# Patient Record
Sex: FEMALE | Race: BLACK OR AFRICAN AMERICAN | NOT HISPANIC OR LATINO | URBAN - METROPOLITAN AREA
[De-identification: names, ages, dates, MRNs, and addresses within clinical notes are randomized per-mention and may not be internally consistent; named-entity substitution may affect disease eponyms.]

---

## 2023-10-26 ENCOUNTER — INPATIENT (INPATIENT)
Facility: HOSPITAL | Age: 88
LOS: 4 days | Discharge: HOME HEALTH | End: 2023-10-31
Attending: STUDENT IN AN ORGANIZED HEALTH CARE EDUCATION/TRAINING PROGRAM | Admitting: STUDENT IN AN ORGANIZED HEALTH CARE EDUCATION/TRAINING PROGRAM
Payer: MEDICARE

## 2023-10-26 VITALS
RESPIRATION RATE: 25 BRPM | TEMPERATURE: 100 F | SYSTOLIC BLOOD PRESSURE: 134 MMHG | OXYGEN SATURATION: 95 % | HEART RATE: 108 BPM | DIASTOLIC BLOOD PRESSURE: 54 MMHG

## 2023-10-26 DIAGNOSIS — R22.0 LOCALIZED SWELLING, MASS AND LUMP, HEAD: ICD-10-CM

## 2023-10-26 DIAGNOSIS — R41.82 ALTERED MENTAL STATUS, UNSPECIFIED: ICD-10-CM

## 2023-10-26 DIAGNOSIS — I10 ESSENTIAL (PRIMARY) HYPERTENSION: ICD-10-CM

## 2023-10-26 DIAGNOSIS — L89.90 PRESSURE ULCER OF UNSPECIFIED SITE, UNSPECIFIED STAGE: ICD-10-CM

## 2023-10-26 DIAGNOSIS — A41.50 GRAM-NEGATIVE SEPSIS, UNSPECIFIED: ICD-10-CM

## 2023-10-26 DIAGNOSIS — A41.9 SEPSIS, UNSPECIFIED ORGANISM: ICD-10-CM

## 2023-10-26 DIAGNOSIS — E78.5 HYPERLIPIDEMIA, UNSPECIFIED: ICD-10-CM

## 2023-10-26 LAB
ALBUMIN SERPL ELPH-MCNC: 2.2 G/DL — LOW (ref 3.3–5)
ALBUMIN SERPL ELPH-MCNC: 2.2 G/DL — LOW (ref 3.3–5)
ALP SERPL-CCNC: 118 U/L — SIGNIFICANT CHANGE UP (ref 40–120)
ALP SERPL-CCNC: 118 U/L — SIGNIFICANT CHANGE UP (ref 40–120)
ALT FLD-CCNC: 26 U/L — SIGNIFICANT CHANGE UP (ref 12–78)
ALT FLD-CCNC: 26 U/L — SIGNIFICANT CHANGE UP (ref 12–78)
ANION GAP SERPL CALC-SCNC: 9 MMOL/L — SIGNIFICANT CHANGE UP (ref 5–17)
ANION GAP SERPL CALC-SCNC: 9 MMOL/L — SIGNIFICANT CHANGE UP (ref 5–17)
APPEARANCE UR: ABNORMAL
APPEARANCE UR: ABNORMAL
APTT BLD: 28 SEC — SIGNIFICANT CHANGE UP (ref 24.5–35.6)
APTT BLD: 28 SEC — SIGNIFICANT CHANGE UP (ref 24.5–35.6)
AST SERPL-CCNC: 70 U/L — HIGH (ref 15–37)
AST SERPL-CCNC: 70 U/L — HIGH (ref 15–37)
BACTERIA # UR AUTO: ABNORMAL
BACTERIA # UR AUTO: ABNORMAL
BASOPHILS # BLD AUTO: 0 K/UL — SIGNIFICANT CHANGE UP (ref 0–0.2)
BASOPHILS # BLD AUTO: 0 K/UL — SIGNIFICANT CHANGE UP (ref 0–0.2)
BASOPHILS NFR BLD AUTO: 0 % — SIGNIFICANT CHANGE UP (ref 0–2)
BASOPHILS NFR BLD AUTO: 0 % — SIGNIFICANT CHANGE UP (ref 0–2)
BILIRUB SERPL-MCNC: 0.5 MG/DL — SIGNIFICANT CHANGE UP (ref 0.2–1.2)
BILIRUB SERPL-MCNC: 0.5 MG/DL — SIGNIFICANT CHANGE UP (ref 0.2–1.2)
BILIRUB UR-MCNC: NEGATIVE — SIGNIFICANT CHANGE UP
BILIRUB UR-MCNC: NEGATIVE — SIGNIFICANT CHANGE UP
BUN SERPL-MCNC: 6 MG/DL — LOW (ref 7–23)
BUN SERPL-MCNC: 6 MG/DL — LOW (ref 7–23)
CALCIUM SERPL-MCNC: 8.3 MG/DL — LOW (ref 8.5–10.1)
CALCIUM SERPL-MCNC: 8.3 MG/DL — LOW (ref 8.5–10.1)
CHLORIDE SERPL-SCNC: 105 MMOL/L — SIGNIFICANT CHANGE UP (ref 96–108)
CHLORIDE SERPL-SCNC: 105 MMOL/L — SIGNIFICANT CHANGE UP (ref 96–108)
CO2 SERPL-SCNC: 22 MMOL/L — SIGNIFICANT CHANGE UP (ref 22–31)
CO2 SERPL-SCNC: 22 MMOL/L — SIGNIFICANT CHANGE UP (ref 22–31)
COLOR SPEC: YELLOW — SIGNIFICANT CHANGE UP
COLOR SPEC: YELLOW — SIGNIFICANT CHANGE UP
COMMENT - URINE: SIGNIFICANT CHANGE UP
COMMENT - URINE: SIGNIFICANT CHANGE UP
CREAT SERPL-MCNC: 0.48 MG/DL — LOW (ref 0.5–1.3)
CREAT SERPL-MCNC: 0.48 MG/DL — LOW (ref 0.5–1.3)
DACRYOCYTES BLD QL SMEAR: SLIGHT — SIGNIFICANT CHANGE UP
DACRYOCYTES BLD QL SMEAR: SLIGHT — SIGNIFICANT CHANGE UP
DIFF PNL FLD: NEGATIVE — SIGNIFICANT CHANGE UP
DIFF PNL FLD: NEGATIVE — SIGNIFICANT CHANGE UP
EGFR: 87 ML/MIN/1.73M2 — SIGNIFICANT CHANGE UP
EGFR: 87 ML/MIN/1.73M2 — SIGNIFICANT CHANGE UP
EOSINOPHIL # BLD AUTO: 0 K/UL — SIGNIFICANT CHANGE UP (ref 0–0.5)
EOSINOPHIL # BLD AUTO: 0 K/UL — SIGNIFICANT CHANGE UP (ref 0–0.5)
EOSINOPHIL NFR BLD AUTO: 0 % — SIGNIFICANT CHANGE UP (ref 0–6)
EOSINOPHIL NFR BLD AUTO: 0 % — SIGNIFICANT CHANGE UP (ref 0–6)
EPI CELLS # UR: SIGNIFICANT CHANGE UP
EPI CELLS # UR: SIGNIFICANT CHANGE UP
GLUCOSE SERPL-MCNC: 99 MG/DL — SIGNIFICANT CHANGE UP (ref 70–99)
GLUCOSE SERPL-MCNC: 99 MG/DL — SIGNIFICANT CHANGE UP (ref 70–99)
GLUCOSE UR QL: NEGATIVE MG/DL — SIGNIFICANT CHANGE UP
GLUCOSE UR QL: NEGATIVE MG/DL — SIGNIFICANT CHANGE UP
HCT VFR BLD CALC: 25.4 % — LOW (ref 34.5–45)
HCT VFR BLD CALC: 25.4 % — LOW (ref 34.5–45)
HGB BLD-MCNC: 7.4 G/DL — LOW (ref 11.5–15.5)
HGB BLD-MCNC: 7.4 G/DL — LOW (ref 11.5–15.5)
INR BLD: 1.13 RATIO — SIGNIFICANT CHANGE UP (ref 0.85–1.18)
INR BLD: 1.13 RATIO — SIGNIFICANT CHANGE UP (ref 0.85–1.18)
KETONES UR-MCNC: NEGATIVE — SIGNIFICANT CHANGE UP
KETONES UR-MCNC: NEGATIVE — SIGNIFICANT CHANGE UP
LACTATE SERPL-SCNC: 1.5 MMOL/L — SIGNIFICANT CHANGE UP (ref 0.7–2)
LACTATE SERPL-SCNC: 1.5 MMOL/L — SIGNIFICANT CHANGE UP (ref 0.7–2)
LEUKOCYTE ESTERASE UR-ACNC: ABNORMAL
LEUKOCYTE ESTERASE UR-ACNC: ABNORMAL
LYMPHOCYTES # BLD AUTO: 1.43 K/UL — SIGNIFICANT CHANGE UP (ref 1–3.3)
LYMPHOCYTES # BLD AUTO: 1.43 K/UL — SIGNIFICANT CHANGE UP (ref 1–3.3)
LYMPHOCYTES # BLD AUTO: 10 % — LOW (ref 13–44)
LYMPHOCYTES # BLD AUTO: 10 % — LOW (ref 13–44)
MANUAL SMEAR VERIFICATION: YES — SIGNIFICANT CHANGE UP
MANUAL SMEAR VERIFICATION: YES — SIGNIFICANT CHANGE UP
MCHC RBC-ENTMCNC: 22.6 PG — LOW (ref 27–34)
MCHC RBC-ENTMCNC: 22.6 PG — LOW (ref 27–34)
MCHC RBC-ENTMCNC: 29.1 G/DL — LOW (ref 32–36)
MCHC RBC-ENTMCNC: 29.1 G/DL — LOW (ref 32–36)
MCV RBC AUTO: 77.4 FL — LOW (ref 80–100)
MCV RBC AUTO: 77.4 FL — LOW (ref 80–100)
MICROCYTES BLD QL: SIGNIFICANT CHANGE UP
MICROCYTES BLD QL: SIGNIFICANT CHANGE UP
MONOCYTES # BLD AUTO: 1 K/UL — HIGH (ref 0–0.9)
MONOCYTES # BLD AUTO: 1 K/UL — HIGH (ref 0–0.9)
MONOCYTES NFR BLD AUTO: 7 % — SIGNIFICANT CHANGE UP (ref 2–14)
MONOCYTES NFR BLD AUTO: 7 % — SIGNIFICANT CHANGE UP (ref 2–14)
NEUTROPHILS # BLD AUTO: 11.86 K/UL — HIGH (ref 1.8–7.4)
NEUTROPHILS # BLD AUTO: 11.86 K/UL — HIGH (ref 1.8–7.4)
NEUTROPHILS NFR BLD AUTO: 81 % — HIGH (ref 43–77)
NEUTROPHILS NFR BLD AUTO: 81 % — HIGH (ref 43–77)
NEUTS BAND # BLD: 2 % — SIGNIFICANT CHANGE UP (ref 0–8)
NEUTS BAND # BLD: 2 % — SIGNIFICANT CHANGE UP (ref 0–8)
NITRITE UR-MCNC: POSITIVE
NITRITE UR-MCNC: POSITIVE
NRBC # BLD: 0 /100 — SIGNIFICANT CHANGE UP (ref 0–0)
NRBC # BLD: 0 /100 — SIGNIFICANT CHANGE UP (ref 0–0)
NRBC # BLD: SIGNIFICANT CHANGE UP /100 WBCS (ref 0–0)
NRBC # BLD: SIGNIFICANT CHANGE UP /100 WBCS (ref 0–0)
PH UR: 8 — SIGNIFICANT CHANGE UP (ref 5–8)
PH UR: 8 — SIGNIFICANT CHANGE UP (ref 5–8)
PLAT MORPH BLD: NORMAL — SIGNIFICANT CHANGE UP
PLAT MORPH BLD: NORMAL — SIGNIFICANT CHANGE UP
PLATELET # BLD AUTO: 489 K/UL — HIGH (ref 150–400)
PLATELET # BLD AUTO: 489 K/UL — HIGH (ref 150–400)
POIKILOCYTOSIS BLD QL AUTO: SLIGHT — SIGNIFICANT CHANGE UP
POIKILOCYTOSIS BLD QL AUTO: SLIGHT — SIGNIFICANT CHANGE UP
POTASSIUM SERPL-MCNC: 4.4 MMOL/L — SIGNIFICANT CHANGE UP (ref 3.5–5.3)
POTASSIUM SERPL-MCNC: 4.4 MMOL/L — SIGNIFICANT CHANGE UP (ref 3.5–5.3)
POTASSIUM SERPL-SCNC: 4.4 MMOL/L — SIGNIFICANT CHANGE UP (ref 3.5–5.3)
POTASSIUM SERPL-SCNC: 4.4 MMOL/L — SIGNIFICANT CHANGE UP (ref 3.5–5.3)
PROT SERPL-MCNC: 7.6 GM/DL — SIGNIFICANT CHANGE UP (ref 6–8.3)
PROT SERPL-MCNC: 7.6 GM/DL — SIGNIFICANT CHANGE UP (ref 6–8.3)
PROT UR-MCNC: NEGATIVE MG/DL — SIGNIFICANT CHANGE UP
PROT UR-MCNC: NEGATIVE MG/DL — SIGNIFICANT CHANGE UP
PROTHROM AB SERPL-ACNC: 13.4 SEC — HIGH (ref 9.5–13)
PROTHROM AB SERPL-ACNC: 13.4 SEC — HIGH (ref 9.5–13)
RAPID RVP RESULT: SIGNIFICANT CHANGE UP
RAPID RVP RESULT: SIGNIFICANT CHANGE UP
RBC # BLD: 3.28 M/UL — LOW (ref 3.8–5.2)
RBC # BLD: 3.28 M/UL — LOW (ref 3.8–5.2)
RBC # FLD: 20.7 % — HIGH (ref 10.3–14.5)
RBC # FLD: 20.7 % — HIGH (ref 10.3–14.5)
RBC BLD AUTO: NORMAL — SIGNIFICANT CHANGE UP
RBC BLD AUTO: NORMAL — SIGNIFICANT CHANGE UP
RBC CASTS # UR COMP ASSIST: SIGNIFICANT CHANGE UP /HPF (ref 0–4)
RBC CASTS # UR COMP ASSIST: SIGNIFICANT CHANGE UP /HPF (ref 0–4)
SARS-COV-2 RNA SPEC QL NAA+PROBE: SIGNIFICANT CHANGE UP
SARS-COV-2 RNA SPEC QL NAA+PROBE: SIGNIFICANT CHANGE UP
SODIUM SERPL-SCNC: 136 MMOL/L — SIGNIFICANT CHANGE UP (ref 135–145)
SODIUM SERPL-SCNC: 136 MMOL/L — SIGNIFICANT CHANGE UP (ref 135–145)
SP GR SPEC: 1.01 — SIGNIFICANT CHANGE UP (ref 1.01–1.02)
SP GR SPEC: 1.01 — SIGNIFICANT CHANGE UP (ref 1.01–1.02)
TROPONIN I, HIGH SENSITIVITY RESULT: 17.1 NG/L — SIGNIFICANT CHANGE UP
TROPONIN I, HIGH SENSITIVITY RESULT: 17.1 NG/L — SIGNIFICANT CHANGE UP
UROBILINOGEN FLD QL: NEGATIVE MG/DL — SIGNIFICANT CHANGE UP
UROBILINOGEN FLD QL: NEGATIVE MG/DL — SIGNIFICANT CHANGE UP
WBC # BLD: 14.29 K/UL — HIGH (ref 3.8–10.5)
WBC # BLD: 14.29 K/UL — HIGH (ref 3.8–10.5)
WBC # FLD AUTO: 14.29 K/UL — HIGH (ref 3.8–10.5)
WBC # FLD AUTO: 14.29 K/UL — HIGH (ref 3.8–10.5)
WBC UR QL: ABNORMAL
WBC UR QL: ABNORMAL

## 2023-10-26 PROCEDURE — 70450 CT HEAD/BRAIN W/O DYE: CPT | Mod: 26,MA

## 2023-10-26 PROCEDURE — 71045 X-RAY EXAM CHEST 1 VIEW: CPT | Mod: 26

## 2023-10-26 PROCEDURE — 99285 EMERGENCY DEPT VISIT HI MDM: CPT

## 2023-10-26 PROCEDURE — 99291 CRITICAL CARE FIRST HOUR: CPT

## 2023-10-26 PROCEDURE — 99223 1ST HOSP IP/OBS HIGH 75: CPT

## 2023-10-26 PROCEDURE — 93010 ELECTROCARDIOGRAM REPORT: CPT

## 2023-10-26 PROCEDURE — 99222 1ST HOSP IP/OBS MODERATE 55: CPT

## 2023-10-26 RX ORDER — VANCOMYCIN HCL 1 G
1000 VIAL (EA) INTRAVENOUS ONCE
Refills: 0 | Status: COMPLETED | OUTPATIENT
Start: 2023-10-26 | End: 2023-10-26

## 2023-10-26 RX ORDER — CEFEPIME 1 G/1
1000 INJECTION, POWDER, FOR SOLUTION INTRAMUSCULAR; INTRAVENOUS ONCE
Refills: 0 | Status: COMPLETED | OUTPATIENT
Start: 2023-10-26 | End: 2023-10-26

## 2023-10-26 RX ORDER — CEFTRIAXONE 500 MG/1
1000 INJECTION, POWDER, FOR SOLUTION INTRAMUSCULAR; INTRAVENOUS EVERY 24 HOURS
Refills: 0 | Status: DISCONTINUED | OUTPATIENT
Start: 2023-10-26 | End: 2023-10-29

## 2023-10-26 RX ORDER — PANTOPRAZOLE SODIUM 20 MG/1
40 TABLET, DELAYED RELEASE ORAL
Refills: 0 | Status: DISCONTINUED | OUTPATIENT
Start: 2023-10-26 | End: 2023-10-27

## 2023-10-26 RX ORDER — SODIUM CHLORIDE 9 MG/ML
1000 INJECTION INTRAMUSCULAR; INTRAVENOUS; SUBCUTANEOUS ONCE
Refills: 0 | Status: COMPLETED | OUTPATIENT
Start: 2023-10-26 | End: 2023-10-26

## 2023-10-26 RX ORDER — HEPARIN SODIUM 5000 [USP'U]/ML
5000 INJECTION INTRAVENOUS; SUBCUTANEOUS EVERY 8 HOURS
Refills: 0 | Status: DISCONTINUED | OUTPATIENT
Start: 2023-10-26 | End: 2023-10-26

## 2023-10-26 RX ORDER — HYDROCHLOROTHIAZIDE 25 MG
1 TABLET ORAL
Refills: 0 | DISCHARGE

## 2023-10-26 RX ORDER — AMLODIPINE BESYLATE 2.5 MG/1
1 TABLET ORAL
Refills: 0 | DISCHARGE

## 2023-10-26 RX ORDER — SODIUM CHLORIDE 9 MG/ML
1000 INJECTION, SOLUTION INTRAVENOUS
Refills: 0 | Status: DISCONTINUED | OUTPATIENT
Start: 2023-10-26 | End: 2023-10-27

## 2023-10-26 RX ORDER — LANOLIN ALCOHOL/MO/W.PET/CERES
3 CREAM (GRAM) TOPICAL AT BEDTIME
Refills: 0 | Status: DISCONTINUED | OUTPATIENT
Start: 2023-10-26 | End: 2023-10-31

## 2023-10-26 RX ORDER — PANTOPRAZOLE SODIUM 20 MG/1
1 TABLET, DELAYED RELEASE ORAL
Refills: 0 | DISCHARGE

## 2023-10-26 RX ORDER — AMLODIPINE BESYLATE 2.5 MG/1
5 TABLET ORAL DAILY
Refills: 0 | Status: DISCONTINUED | OUTPATIENT
Start: 2023-10-26 | End: 2023-10-27

## 2023-10-26 RX ORDER — ENOXAPARIN SODIUM 100 MG/ML
40 INJECTION SUBCUTANEOUS EVERY 24 HOURS
Refills: 0 | Status: DISCONTINUED | OUTPATIENT
Start: 2023-10-26 | End: 2023-10-31

## 2023-10-26 RX ORDER — ROSUVASTATIN CALCIUM 5 MG/1
1 TABLET ORAL
Refills: 0 | DISCHARGE

## 2023-10-26 RX ORDER — ASPIRIN/CALCIUM CARB/MAGNESIUM 324 MG
81 TABLET ORAL DAILY
Refills: 0 | Status: DISCONTINUED | OUTPATIENT
Start: 2023-10-26 | End: 2023-10-27

## 2023-10-26 RX ORDER — ACETAMINOPHEN 500 MG
650 TABLET ORAL EVERY 6 HOURS
Refills: 0 | Status: DISCONTINUED | OUTPATIENT
Start: 2023-10-26 | End: 2023-10-27

## 2023-10-26 RX ORDER — ATORVASTATIN CALCIUM 80 MG/1
20 TABLET, FILM COATED ORAL AT BEDTIME
Refills: 0 | Status: DISCONTINUED | OUTPATIENT
Start: 2023-10-26 | End: 2023-10-27

## 2023-10-26 RX ORDER — MORPHINE SULFATE 50 MG/1
1 CAPSULE, EXTENDED RELEASE ORAL EVERY 6 HOURS
Refills: 0 | Status: DISCONTINUED | OUTPATIENT
Start: 2023-10-26 | End: 2023-10-27

## 2023-10-26 RX ORDER — ASPIRIN/CALCIUM CARB/MAGNESIUM 324 MG
1 TABLET ORAL
Refills: 0 | DISCHARGE

## 2023-10-26 RX ORDER — ACETAMINOPHEN 500 MG
1000 TABLET ORAL ONCE
Refills: 0 | Status: COMPLETED | OUTPATIENT
Start: 2023-10-26 | End: 2023-10-26

## 2023-10-26 RX ADMIN — CEFEPIME 1000 MILLIGRAM(S): 1 INJECTION, POWDER, FOR SOLUTION INTRAMUSCULAR; INTRAVENOUS at 12:02

## 2023-10-26 RX ADMIN — SODIUM CHLORIDE 1000 MILLILITER(S): 9 INJECTION INTRAMUSCULAR; INTRAVENOUS; SUBCUTANEOUS at 11:16

## 2023-10-26 RX ADMIN — Medication 1000 MILLIGRAM(S): at 13:15

## 2023-10-26 RX ADMIN — SODIUM CHLORIDE 1000 MILLILITER(S): 9 INJECTION INTRAMUSCULAR; INTRAVENOUS; SUBCUTANEOUS at 12:40

## 2023-10-26 RX ADMIN — Medication 250 MILLIGRAM(S): at 12:02

## 2023-10-26 RX ADMIN — Medication 1000 MILLIGRAM(S): at 11:49

## 2023-10-26 RX ADMIN — Medication 400 MILLIGRAM(S): at 11:29

## 2023-10-26 RX ADMIN — Medication 1000 MILLIGRAM(S): at 13:08

## 2023-10-26 RX ADMIN — SODIUM CHLORIDE 75 MILLILITER(S): 9 INJECTION, SOLUTION INTRAVENOUS at 17:01

## 2023-10-26 RX ADMIN — CEFTRIAXONE 100 MILLIGRAM(S): 500 INJECTION, POWDER, FOR SOLUTION INTRAMUSCULAR; INTRAVENOUS at 18:03

## 2023-10-26 RX ADMIN — ENOXAPARIN SODIUM 40 MILLIGRAM(S): 100 INJECTION SUBCUTANEOUS at 18:03

## 2023-10-26 RX ADMIN — CEFEPIME 100 MILLIGRAM(S): 1 INJECTION, POWDER, FOR SOLUTION INTRAMUSCULAR; INTRAVENOUS at 11:30

## 2023-10-26 NOTE — PATIENT PROFILE ADULT - FALL HARM RISK - HARM RISK INTERVENTIONS

## 2023-10-26 NOTE — H&P ADULT - PROBLEM SELECTOR PLAN 6
stage 3 left buttock ulcer, dose not appear to be infected  Wound care PT consult  Frequent turn/positioning

## 2023-10-26 NOTE — H&P ADULT - HISTORY OF PRESENT ILLNESS
Talked to daughter Katlyn ( [phone])  89 years old female with h/o HTN, HLD, GERD, decubitus ulcer was brought in to ED with concern for AMS. Daughter noted patient appear confused today AM with some blood around the tongue. Patient reported dysuria for last few days. No nausea or vomiting. Patient is bedbound since around 08/2023. Patient also appear to have some dysphagia and is currently only able to eat puree diet.   Febrile to 101.7, tachycardic upon arrival. WBC 14.29, plt 489, Hb 7.4, K 4.4, Cr 0.48, hsTnT 17.1, lactate 1.5. UA positive for UTI. RVP negative. CT head with no acute pathology. Mild to mod chronic microvascular changes. CXR with no focal consolidation    SH: no toxic habits

## 2023-10-26 NOTE — ED ADULT NURSE NOTE - AS SC BRADEN FRICTION
CBC, CMP, med clearance.  Labs done from outside facility - Dr. Frausto's office will fax.   (1) problem

## 2023-10-26 NOTE — CONSULT NOTE ADULT - ASSESSMENT
Assessment: 89 year old female with PMHx of HTN & HLD with tongue swelling secondary to bite wound.     Recommendations:   - Tongue swelling 2/2 to bite wound. Possibly from seizure activity?   - Empiric Antibiotics to treat UTI. Follow up urine culture.   - Reconsult if change in voice, stridor, not protecting airway, worsening swelling or inability to clear secretions.   -Patient hemodynamically stable and does not require ICU level of care. Discussed with ICU attending MD Martinez

## 2023-10-26 NOTE — H&P ADULT - NSHPPHYSICALEXAM_GEN_ALL_CORE
CONSTITUTIONAL: alert and cooperative, no acute distress.  EYES: PERRL, EOMI,  ENT: Mucosa moist, moderate tongue swelling noted. Small area of bleeding noted on floor of tongue near frenulum area, likely due to lower teeth deformity putting chronic pressure on bottom of tongue  NECK: Neck supple, trachea midline, non-tender  CARDIAC: Normal S1 and S2. Regular rate and rhythms. No Pedal edema. Peripheral pulses intact  LUNGS: Equal air entry both lungs. No rales, rhonchi, wheezing. Normal respiratory effort.   ABDOMEN: Soft, nondistended, nontender. No guarding or rebound tenderness. No hepatomegaly or splenomegaly. Bowel sound normal.  MUSCULOSKELETAL: Normocephalic, atraumatic. No significant deformity or joint abnormality. Stage 3 left buttock decubitus ulcer, dose not appear to be infected  NEUROLOGICAL: Move all extremities  PSYCHIATRIC: A&O x 2, appropriate mood and affect.

## 2023-10-26 NOTE — H&P ADULT - NSVTERISKREFERASSESS_GEN_ALL_CORE
MG tablet, , Disp: , Rfl:     atorvastatin (LIPITOR) 80 MG tablet, Take 80 mg by mouth daily, Disp: , Rfl:     clopidogrel (PLAVIX) 75 MG tablet, Take 75 mg by mouth daily, Disp: , Rfl:     aspirin 81 MG tablet, Take 81 mg by mouth daily, Disp: , Rfl:     Multiple Vitamins-Minerals (THERAPEUTIC MULTIVITAMIN-MINERALS) tablet, Take 1 tablet by mouth daily, Disp: , Rfl:     HYDROcodone-acetaminophen (NORCO) 5-325 MG per tablet, Take 1 tablet by mouth three times daily. , Disp: 90 tablet, Rfl: 0    gabapentin (NEURONTIN) 100 MG capsule, Take 1 capsule by mouth daily (before lunch). , Disp: 30 capsule, Rfl: 5    FIBER PO, Take  by mouth., Disp: , Rfl:     vitamin D (CHOLECALCIFEROL) 1000 UNIT TABS tablet, Take 1,000 Units by mouth daily. , Disp: , Rfl:     diphenhydrAMINE (BENADRYL) 25 MG tablet, Take 25 mg by mouth every 6 hours as needed. , Disp: , Rfl:     tiZANidine (ZANAFLEX) 4 MG tablet, Take 1 tablet by mouth every 8 hours as needed. , Disp: 90 tablet, Rfl: 5    docusate sodium (COLACE) 100 MG capsule, Take 100 mg by mouth 2 times daily. , Disp: , Rfl:   Allergies:  Demerol; Colton Bear; Benazepril hcl; Benicar [olmesartan medoxomil]; Hctz; and Succinylcholine chloride  Social History:    reports that he has never smoked. He has never used smokeless tobacco. He reports current alcohol use. He reports that he does not use drugs.   Family History:   Family History   Problem Relation Age of Onset    Diabetes Neg Hx     High Cholesterol Neg Hx     High Blood Pressure Neg Hx        REVIEW OF SYSTEMS: Full ROS noted & scanned   CONSTITUTIONAL: Denies unexplained weight loss, fevers, chills or fatigue  NEUROLOGICAL: Denies unsteady gait or progressive weakness  MUSCULOSKELETAL: Denies joint swelling or redness  PSYCHOLOGICAL: Denies anxiety, depression   SKIN: Denies skin changes, delayed healing, rash, itching   HEMATOLOGIC: Denies easy bleeding or bruising  ENDOCRINE: Denies excessive thirst, urination, Refer to the Assessment tab to view/cancel completed assessment.

## 2023-10-26 NOTE — H&P ADULT - ASSESSMENT
89 years old female with h/o HTN, HLD, GERD, decubitus ulcer was brought in to ED with concern for AMS. Daughter noted patient appear confused today AM with some blood around the tongue. Patient reported dysuria for last few days. No nausea or vomiting. Patient is bedbound since around 08/2023. Patient also appear to have some dysphagia and is currently only able to eat puree diet.   Febrile to 101.7, tachycardic upon arrival. WBC 14.29, plt 489, Hb 7.4, K 4.4, Cr 0.48, hsTnT 17.1, lactate 1.5. UA positive for UTI. RVP negative. CT head  ( I personally review) with no acute pathology. Mild to mod chronic microvascular changes. CXR  ( I personally review) with no focal consolidation

## 2023-10-26 NOTE — H&P ADULT - PROBLEM SELECTOR PLAN 3
Small area of bleeding noted on floor of tongue near frenulum area, likely due to lower teeth deformity putting chronic pressure on bottom of tongue   No other area of tongue trauma noted. No stridor  Tongue swelling likely related to trauma from lower teeth  Not on any new medication. Not on ACEI or ARB  Speech/swallow evaluation  Pain control  Aspiration precaution  ICU consulted. Protecting airway. No need for ICU care at this time  Closely monitor respiratory status  Would benefit from outpatient dental follow up

## 2023-10-26 NOTE — PHARMACOTHERAPY INTERVENTION NOTE - COMMENTS
Recommended to switch from heparin 5000 units q8H to enoxaparin 40mg q24H for DVT prophylaxis as patient has normal renal function (Scr 0.48).

## 2023-10-26 NOTE — H&P ADULT - CONVERSATION DETAILS
Discussed with daughter at bedside about GOC. Daughter said she would have to discuss with her siblings first. Remain full code at this time. Palliative care consulted for GOC discussion.

## 2023-10-26 NOTE — ED PROVIDER NOTE - PHYSICAL EXAMINATION
GEN: Awake, alert, interactive, NAD.  HEAD AND NECK: NC/AT. Airway patent. Neck supple.   EYES:  Clear b/l. EOMI. PERRL.   ENT: Moist mucus membranes. (+) dry blood on the tongue with mild swelling of the tongue.   CARDIAC: Regular rate, regular rhythm. No evident pedal edema.    RESP/CHEST: Normal respiratory effort with no use of accessory muscles or retractions. Clear throughout on auscultation.  ABD: soft, non-distended, non-tender. No rebound, no guarding.   BACK: No midline spinal TTP. No CVAT.   EXTREMITIES: Moving all extremities with no apparent deformities.   SKIN: Warm, dry, intact normal color. No rash.   NEURO: AOx1, CN II-XII grossly intact,  Pt moving all extremities.   PSYCH: Appropriate mood and affect.

## 2023-10-26 NOTE — ED PROVIDER NOTE - ATTENDING APP SHARED VISIT CONTRIBUTION OF CARE
Patient evaluated and seen with CHONG Roberts as a shared visit - agree with above history and physical - pt examined and seen by me personally - findings as seen:  Pt seen as possible sepsis and new onset seizure - otherwise noted on labs to have UTI and mental status improving - otherwise given sepsis fluids and abx and will admit for further evaluation. Neurology also consulted for possible new onset seizure        35 minutes of critical care provided given pt presentation of early sepsis with UTI/AMS

## 2023-10-26 NOTE — ED ADULT NURSE NOTE - NSFALLHARMRISKINTERV_ED_ALL_ED
Assistance OOB with selected safe patient handling equipment if applicable/Assistance with ambulation/Communicate risk of Fall with Harm to all staff, patient, and family/Monitor gait and stability/Monitor for mental status changes and reorient to person, place, and time, as needed/Provide visual cue: red socks, yellow wristband, yellow gown, etc/Reinforce activity limits and safety measures with patient and family/Toileting schedule using arm’s reach rule for commode and bathroom/Use of alarms - bed, stretcher, chair and/or video monitoring/Bed in lowest position, wheels locked, appropriate side rails in place/Call bell, personal items and telephone in reach/Instruct patient to call for assistance before getting out of bed/chair/stretcher/Non-slip footwear applied when patient is off stretcher/[place] to call system/Physically safe environment - no spills, clutter or unnecessary equipment/Purposeful Proactive Rounding/Room/bathroom lighting operational, light cord in reach

## 2023-10-26 NOTE — CONSULT NOTE ADULT - SUBJECTIVE AND OBJECTIVE BOX
INTERVAL HPI: 89 y/o female with HTN & HLD, brought in by EMS for AMS today. As per daughter pt was found this morning with swollen tongue & bleeding from her mouth. Daughter is concerned for possible seizures, but did not witness at convulsions. Pt does not have history of seizures. Daughter states pt was responsive and well yesterday around 8PM. Daughter states patient has normal speech. Daughter states pt has had a couch since yesterday. Pt does not take any ACE-I as per list from daughters phone. Daughter denies any trouble breathing, wheezing or recent falls.      SUBJECTIVE: Patient seen and examined at bedside for possible airway compromise. Pt is in no acute distress currently on NC. Mild tongue swelling noted and patient actively trying to speak.     ROS: All negative except as listed above.    VITAL SIGNS:  ICU Vital Signs Last 24 Hrs  T(C): 37.5 (26 Oct 2023 13:15), Max: 38.7 (26 Oct 2023 11:16)  T(F): 99.5 (26 Oct 2023 13:15), Max: 101.7 (26 Oct 2023 11:16)  HR: 98 (26 Oct 2023 13:15) (97 - 108)  BP: 110/46 (26 Oct 2023 13:15) (97/51 - 134/54)  BP(mean): --  ABP: --  ABP(mean): --  RR: 21 (26 Oct 2023 13:15) (19 - 25)  SpO2: 99% (26 Oct 2023 13:15) (95% - 100%)    O2 Parameters below as of 26 Oct 2023 13:15  Patient On (Oxygen Delivery Method): nasal cannula  O2 Flow (L/min): 2        ECG: reviewed.    PHYSICAL EXAM:  GENERAL: NAD, lying in bed comfortably  HEAD:  Atraumatic, normocephalic  EYES: EOMI, PERRLA, conjunctiva and sclera clear  ENT: Mild tongue swelling with clear wound to underside of tongue surrounded by dried blood. No stridor, lip or facial swelling.   NECK: Supple, trachea midline, no JVD, no swelling, masses or crepitus. Anterior neck non-tender to palpation.  HEART: Regular rate and rhythm, no murmurs, rubs, or gallops  LUNGS: Unlabored respirations. Clear to auscultation bilaterally, no crackles, wheezing, or rhonchi  ABDOMEN: Soft, nontender, nondistended, +BS  EXTREMITIES: 2+ peripheral pulses bilaterally. No clubbing or cyanosis. Mild LE edema bilaterally.   NERVOUS SYSTEM:  A&Ox3, following commands, moving all extremities, no focal deficits   SKIN: No rashes. (+) wound to the left buttock.     MEDICATIONS:  MEDICATIONS  (STANDING):    MEDICATIONS  (PRN):      ALLERGIES:  Allergies    No Known Allergies    Intolerances        LABS:                        7.4    14.29 )-----------( 489      ( 26 Oct 2023 11:10 )             25.4     10-26    136  |  105  |  6<L>  ----------------------------<  99  4.4   |  22  |  0.48<L>    Ca    8.3<L>      26 Oct 2023 11:10    TPro  7.6  /  Alb  2.2<L>  /  TBili  0.5  /  DBili  x   /  AST  70<H>  /  ALT  26  /  AlkPhos  118  10-26    PT/INR - ( 26 Oct 2023 11:10 )   PT: 13.4 sec;   INR: 1.13 ratio         PTT - ( 26 Oct 2023 11:10 )  PTT:28.0 sec  Urinalysis Basic - ( 26 Oct 2023 12:00 )  Color: Yellow / Appearance: Slightly Turbid / S.010 / pH: x  Gluc: x / Ketone: Negative  / Bili: Negative / Urobili: Negative mg/dL   Blood: x / Protein: Negative mg/dL / Nitrite: Positive   Leuk Esterase: Trace / RBC: 0-2 /HPF / WBC 6-10   Sq Epi: x / Non Sq Epi: x / Bacteria: Few            RADIOLOGY & ADDITIONAL TESTS: Reviewed.    < from: CT Head No Cont (10.26.23 @ 12:21) >  IMPRESSION:  Mild to moderate chronic microvascular changes without   evidence of an acute transcortical infarction or hemorrhage.    --- End of Report ---    < end of copied text >    < from: Xray Chest 1 View-PORTABLE IMMEDIATE (10.26.23 @ 11:28) >    IMPRESSION:  Mildly elevated right hemidiaphragm.    No focal lung consolidation.    --- End of Report ---    < end of copied text >

## 2023-10-26 NOTE — CONSULT NOTE ADULT - CRITICAL CARE ATTENDING COMMENT
89M HTN HLD Brought in from home for oral bleeding. Suspect seizure activity? no medications notable for seizure induction agents. UA notable for possible UTI. Lab work otherwise unremarkable. Physical exam consistent with tongue biting. Resp intact. no wheezing / stridor / tachypena / voice changes / throat symptoms. Patient in no immediate concern for airway collapse at the moment. recommend abx for UTI. No indication for ICU admission at the moment.

## 2023-10-26 NOTE — ED PROVIDER NOTE - NS ED ATTENDING STATEMENT MOD
I have personally provided the amount of critical care time documented below concurrently with the resident/fellow.  This time excludes time spent on separate procedures and time spent teaching. I have reviewed the resident’s / fellow’s documentation and I agree with the history, exam, and assessment and plan of care. This was a shared visit with the TANA. I reviewed and verified the documentation and independently performed the documented:

## 2023-10-26 NOTE — H&P ADULT - PROBLEM SELECTOR PLAN 2
Likely metabolic encephalopathy due to UTI  CT head  ( I personally review) no acute pathology. Mild to mod chronic microvascular changes  Mental status at baseline  check EEG

## 2023-10-26 NOTE — H&P ADULT - PROBLEM SELECTOR PLAN 1
fever, leukocytosis, tachycardic upon arrival, transient confusion, UTI  Received Vanco and cefepime in ED  continue ceftriaxone, follow up culture data

## 2023-10-26 NOTE — ED ADULT NURSE NOTE - BREATHING, MLM
Used blue language line  #[id number] to review discharge information with patient and cousin, Zach Phillips. Patient and cousin verbalized understanding of written instructions communicated through . Spontaneous, unlabored and symmetrical

## 2023-10-26 NOTE — ED ADULT NURSE NOTE - OBJECTIVE STATEMENT
pt presents to the ed with daughter who states pt is altered. Daughter stated she put pt to bed at 9am last night and woke up this morning saw her tongue with dried blood and swollen. Pt is noted to be alert to her self, warm to touch, pt is febrile, sepsis protocols were activated. Pt was noted to have +4 b/l LE, heeling pressure injury on left hip area and 2x1cm pressure injury on left buttocks. b/L breath sounds are clear, pt is noted to be drooling, pt saturating at 100% with 2LNC. No abdominal tenderness noted on palpation. pt placed on cardiac monitor SR noted.

## 2023-10-26 NOTE — ED PROVIDER NOTE - ATTENDING CONTRIBUTION TO CARE
Patient evaluated and seen with CHONG Roberts as a shared visit - agree with above history and physical - pt examined and seen by me personally - findings as seen:  Pt seen as possible sepsis and new onset seizure - otherwise noted on labs to have UTI and mental status improving - otherwise given sepsis fluids and abx and will admit for further evaluation. Neurology also consulted for possible new onset seizure

## 2023-10-26 NOTE — ED PROVIDER NOTE - CLINICAL SUMMARY MEDICAL DECISION MAKING FREE TEXT BOX
89 female with htn, high chol here with unresponsive today Rectal t 101.  Sepsis labs, cxr, ekg, ct head ordered and treat with ivf, tylenol, and empirical abx .

## 2023-10-26 NOTE — CONSULT NOTE ADULT - SUBJECTIVE AND OBJECTIVE BOX
BIBEMS from home.  History from Admission H&P    <Start of quote(s) from H&P>  "Reason for Admission: sepsis due to UTI,  tongue swelling  History of Present Illness:   Talked to daughter Katlyn ( [phone])  89 years old female with h/o HTN, HLD, GERD, decubitus ulcer was brought in to ED with concern for AMS. Daughter noted patient appear confused today AM with some blood around the tongue. Patient reported dysuria for last few days. No nausea or vomiting. Patient is bedbound since around 08/2023. Patient also appear to have some dysphagia and is currently only able to eat puree diet.   Febrile to 101.7, tachycardic upon arrival. WBC 14.29, plt 489, Hb 7.4, K 4.4, Cr 0.48, hsTnT 17.1, lactate 1.5. UA positive for UTI. RVP negative. CT head with no acute pathology. Mild to mod chronic microvascular changes. CXR with no focal consolidation    SH: no toxic habits       Review of Systems:  Review of Systems: All ROS were negative except transient confusion, tongue swelling, dysuria   . . .       Home Medications:   * Patient Currently Takes Medications as of 26-Oct-2023 14:56 documented in Structured Notes  · 	aspirin 81 mg oral delayed release tablet: Last Dose Taken:  , 1 tab(s) orally once a day  · 	hydroCHLOROthiazide 25 mg oral tablet: Last Dose Taken:  , 1 tab(s) orally once a day  · 	amLODIPine 5 mg oral tablet: Last Dose Taken:  , 1 tab(s) orally once a day  · 	pantoprazole 40 mg oral delayed release tablet: Last Dose Taken:  , 1 tab(s) orally once a day  · 	rosuvastatin 5 mg oral tablet: Last Dose Taken:  , 1 tab(s) orally once a day   . . .   · Substance use	No"  <End of quote(s) from H&P>         BIBEMS from home.  History from Admission H&P    <Start of quote(s) from H&P>  "Reason for Admission: sepsis due to UTI,  tongue swelling  History of Present Illness:   Talked to daughter Katlyn ( [phone])  89 years old female with h/o HTN, HLD, GERD, decubitus ulcer was brought in to ED with concern for AMS. Daughter noted patient appear confused today AM with some blood around the tongue. Patient reported dysuria for last few days. No nausea or vomiting. Patient is bedbound since around 08/2023. Patient also appear to have some dysphagia and is currently only able to eat puree diet.   Febrile to 101.7, tachycardic upon arrival. WBC 14.29, plt 489, Hb 7.4, K 4.4, Cr 0.48, hsTnT 17.1, lactate 1.5. UA positive for UTI. RVP negative. CT head with no acute pathology. Mild to mod chronic microvascular changes. CXR with no focal consolidation    SH: no toxic habits       Review of Systems:  Review of Systems: All ROS were negative except transient confusion, tongue swelling, dysuria   . . .       Home Medications:   * Patient Currently Takes Medications as of 26-Oct-2023 14:56 documented in Structured Notes  · 	aspirin 81 mg oral delayed release tablet: Last Dose Taken:  , 1 tab(s) orally once a day  · 	hydroCHLOROthiazide 25 mg oral tablet: Last Dose Taken:  , 1 tab(s) orally once a day  · 	amLODIPine 5 mg oral tablet: Last Dose Taken:  , 1 tab(s) orally once a day  · 	pantoprazole 40 mg oral delayed release tablet: Last Dose Taken:  , 1 tab(s) orally once a day  · 	rosuvastatin 5 mg oral tablet: Last Dose Taken:  , 1 tab(s) orally once a day   . . .   · Substance use	No"  <End of quote(s) from H&P>    Per my interviewing Pt's daughter:    Pt last got out of bed with assistance 8/9/23.  She last was able to get out of bed without assistance some ?two yeara ago.  Reportedly has had no falls.      Per radiology report of non-con CT head:  "FINDINGS:  There is periventricular and subcortical white matter hypodensity without   mass effect, nonspecific, likely representing mild to moderate chronic   microvascular ischemic changes. There is no compelling evidence for an   acute transcortical infarction. There is no evidence of mass, mass   effect, midline shift or extra-axial fluid collection. The lateral   ventricles and cortical sulci are age-appropriate in size and   configuration. Mild inflammatory mucosal changes are seen throughout the   various portions of the paranasal sinuses. The orbits and mastoid air   cells are unremarkable. The calvarium is intact. Consider MRI as   clinically warranted.    IMPRESSION:  Mild to moderate chronic microvascular changes without   evidence of an acute transcortical infarction or hemorrhage."    Microcytic anemia.  Leucocytosis  Turbid urine, nitrite positive.      EXAMINATION     Awake. On [place], semi-recumbent.  Hard of hearing. VERY dysarthric.  Provides her first name but not her surnname, even though asked specifically.        Reflex                           Right    Left   Comment    Biceps                             1        1  Triceps                            0        0  Patellar                            0       0  Gastroc                           0       0  Plantar                         mute extensor?   BIBEMS from home.  History from Admission H&P    <Start of quote(s) from H&P>  "Reason for Admission: sepsis due to UTI,  tongue swelling  History of Present Illness:   Talked to daughter Katlyn ( [phone])  89 years old female with h/o HTN, HLD, GERD, decubitus ulcer was brought in to ED with concern for AMS. Daughter noted patient appear confused today AM with some blood around the tongue. Patient reported dysuria for last few days. No nausea or vomiting. Patient is bedbound since around 08/2023. Patient also appear to have some dysphagia and is currently only able to eat puree diet.   Febrile to 101.7, tachycardic upon arrival. WBC 14.29, plt 489, Hb 7.4, K 4.4, Cr 0.48, hsTnT 17.1, lactate 1.5. UA positive for UTI. RVP negative. CT head with no acute pathology. Mild to mod chronic microvascular changes. CXR with no focal consolidation    SH: no toxic habits       Review of Systems:  Review of Systems: All ROS were negative except transient confusion, tongue swelling, dysuria   . . .       Home Medications:   * Patient Currently Takes Medications as of 26-Oct-2023 14:56 documented in Structured Notes  · 	aspirin 81 mg oral delayed release tablet: Last Dose Taken:  , 1 tab(s) orally once a day  · 	hydroCHLOROthiazide 25 mg oral tablet: Last Dose Taken:  , 1 tab(s) orally once a day  · 	amLODIPine 5 mg oral tablet: Last Dose Taken:  , 1 tab(s) orally once a day  · 	pantoprazole 40 mg oral delayed release tablet: Last Dose Taken:  , 1 tab(s) orally once a day  · 	rosuvastatin 5 mg oral tablet: Last Dose Taken:  , 1 tab(s) orally once a day   . . .   · Substance use	No"  <End of quote(s) from H&P>    Per my interviewing Pt's daughter:    Pt last got out of bed with assistance 8/9/23.  She last was able to get out of bed without assistance some ?two yeara ago.  Reportedly has had no falls.      Per radiology report of non-con CT head:  "FINDINGS:  There is periventricular and subcortical white matter hypodensity without   mass effect, nonspecific, likely representing mild to moderate chronic   microvascular ischemic changes. There is no compelling evidence for an   acute transcortical infarction. There is no evidence of mass, mass   effect, midline shift or extra-axial fluid collection. The lateral   ventricles and cortical sulci are age-appropriate in size and   configuration. Mild inflammatory mucosal changes are seen throughout the   various portions of the paranasal sinuses. The orbits and mastoid air   cells are unremarkable. The calvarium is intact. Consider MRI as   clinically warranted.    IMPRESSION:  Mild to moderate chronic microvascular changes without   evidence of an acute transcortical infarction or hemorrhage."    Microcytic anemia.  Leucocytosis  Turbid urine, nitrite positive.      EXAMINATION     Awake. On Gur[place], semi-recumbent.  Hard of hearing. VERY dysarthric.  Profound psychomotor slowing.  Provides her first name but not her surname, even though asked specifically.  Responds that she is 89 (years old).  Meiotic pupils, min reactive.  Gaze appears to be grossly conjugate.  Does not follow instructions for confrontation testing of visual fields.  Appears to have bilateral visual threat responses.  Contused swollen tip of tongue (bilaterally).  Tongue is deviated in place to the R.      Flexion contractures all four limbs.  Elbows cannot be passively extended past ~150 degrees.  EXTREMELY weak all four limbs.  Finger flexors appear to be weaker on the R side  (cannot assess other UE muscle function to any degree of reliability to detect asymmetries).      Non-specific motor response whenever asked to do something is to open her mouth (non-specific cortical release sign).      Reflex                           Right    Left   Comment    Biceps                             1        1  Triceps                            0        0  Patellar                            0       0  Gastroc                           0       0  Plantar                         mute extensor?    Does not provide answers when asked to differentiate LT from pin stimuli.  Delayed wincing to irritating stimulation of any limb; no obvious asymmetry.

## 2023-10-26 NOTE — ED PROVIDER NOTE - OBJECTIVE STATEMENT
89 y/o female with htn , high chol brought in by EMS for AMS today. As per daughter pt was unresponsive today and bleeding from her mouth, states might have bit her tongue. Daughter states pt was responsive and well yesterday around 8PM. Denies fever ,vomiting, coughing or recent illnesses. Pt has no history of seizures in the past or recent falls.

## 2023-10-26 NOTE — ED ADULT TRIAGE NOTE - CHIEF COMPLAINT QUOTE
BIBEMS from home AMS r/o sepsis as per family pt was unresponsive and was bleeding from her mouth pt with swollen tongue warm to touch . pt with a 18 g to right arm with NS running by EMS

## 2023-10-26 NOTE — CONSULT NOTE ADULT - ASSESSMENT
Bedbound >2 months.    Functionally quadriplegic.      UTI.     Toxic metabolic encephalopathy.     Likely demented at baseline.      Appears to have a R hemiparesis (tongue deviation and R hand weakness).  Age unknown.      Seizure seems unlikely.        RECOMMENDATIONS    B12, folate, FE, TIBC, methylmalonic acid, homocysteine, TSH, RPR.     Bedbound >2 months.    Functionally quadriplegic.      UTI.     Toxic metabolic encephalopathy.     Likely demented at baseline.      Appears to have a R hemiparesis (tongue deviation and R hand weakness).  Age unknown.  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    Cannot exclude possibilities of having had bilateral (small vessel - not seen on CT today)  ischemic infarcts.  Cannot exclude cervical myelopathy (however, there is only one "long tract" sign - the ?extensor L plantar response - which would be unusual for cervical cord compression as in spondylosis).      Seizure seems unlikely.          RECOMMENDATIONS    B12, folate, FE, TIBC, methylmalonic acid, homocysteine, TSH, RPR.      Non-con c-spine CT.    Routine EEG.      ASA 81mg daily.      Cardiac telemetry.      TTE with "bubble' study.                                                           IMPORTANT  -  PLEASE NOTE:                              I am a neurohospitalist. I do not see patients outside of the hospital.        Patients requiring neurological follow-up after discharge may contact one of the following offices.     [address] [place] [address]  [phone]    [facility]  [address].  [place], [place]  [phone]    Brissa Gutierrez M.D.   - Department of Neurology  [employer] at \[facility]\""/[facility]     Bedbound >2 months.    Functionally quadriplegic.      UTI.     Toxic metabolic encephalopathy.     Likely demented at baseline.      Appears to have a R hemiparesis (tongue deviation and R hand weakness).  Age unknown.  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    Cannot exclude possibilities of having had bilateral (small vessel - not seen on CT today)  ischemic infarcts.  Cannot exclude cervical myelopathy (however, there is only one "long tract" sign - the ?extensor L plantar response - which would be unusual for cervical cord compression as in spondylosis).      Seizure seems unlikely.      MR of head or c-spine not expected to .          RECOMMENDATIONS    B12, folate, FE, TIBC, methylmalonic acid, homocysteine, TSH, RPR, .      Non-con c-spine CT.    Routine EEG.      ASA 81mg daily on suspicion of stroke od undetermined age.  Would not use dual antiplatelet Tx.      Cardiac telemetry.      TTE with "bubble' study.                                                           IMPORTANT  -  PLEASE NOTE:                              I am a neurohospitalist. I do not see patients outside of the hospital.        Patients requiring neurological follow-up after discharge may contact one of the following offices.     [facility] [place]  [address].  [place], [place] [id number] [phone]    [facility]  [address].  [place], [place]  [phone]    Brissa Gutierrez M.D.   - Department of Neurology  [employer] at \[facility]\""/[facility]

## 2023-10-27 DIAGNOSIS — Z51.5 ENCOUNTER FOR PALLIATIVE CARE: ICD-10-CM

## 2023-10-27 DIAGNOSIS — R13.10 DYSPHAGIA, UNSPECIFIED: ICD-10-CM

## 2023-10-27 DIAGNOSIS — R53.81 OTHER MALAISE: ICD-10-CM

## 2023-10-27 DIAGNOSIS — G93.41 METABOLIC ENCEPHALOPATHY: ICD-10-CM

## 2023-10-27 LAB
ABO RH CONFIRMATION: SIGNIFICANT CHANGE UP
ABO RH CONFIRMATION: SIGNIFICANT CHANGE UP
ALBUMIN SERPL ELPH-MCNC: 2.1 G/DL — LOW (ref 3.3–5)
ALBUMIN SERPL ELPH-MCNC: 2.1 G/DL — LOW (ref 3.3–5)
ALP SERPL-CCNC: 110 U/L — SIGNIFICANT CHANGE UP (ref 40–120)
ALP SERPL-CCNC: 110 U/L — SIGNIFICANT CHANGE UP (ref 40–120)
ALT FLD-CCNC: 18 U/L — SIGNIFICANT CHANGE UP (ref 12–78)
ALT FLD-CCNC: 18 U/L — SIGNIFICANT CHANGE UP (ref 12–78)
ANION GAP SERPL CALC-SCNC: 11 MMOL/L — SIGNIFICANT CHANGE UP (ref 5–17)
ANION GAP SERPL CALC-SCNC: 11 MMOL/L — SIGNIFICANT CHANGE UP (ref 5–17)
AST SERPL-CCNC: 25 U/L — SIGNIFICANT CHANGE UP (ref 15–37)
AST SERPL-CCNC: 25 U/L — SIGNIFICANT CHANGE UP (ref 15–37)
BILIRUB SERPL-MCNC: 0.4 MG/DL — SIGNIFICANT CHANGE UP (ref 0.2–1.2)
BILIRUB SERPL-MCNC: 0.4 MG/DL — SIGNIFICANT CHANGE UP (ref 0.2–1.2)
BLD GP AB SCN SERPL QL: SIGNIFICANT CHANGE UP
BLD GP AB SCN SERPL QL: SIGNIFICANT CHANGE UP
BUN SERPL-MCNC: 6 MG/DL — LOW (ref 7–23)
BUN SERPL-MCNC: 6 MG/DL — LOW (ref 7–23)
CALCIUM SERPL-MCNC: 8.6 MG/DL — SIGNIFICANT CHANGE UP (ref 8.5–10.1)
CALCIUM SERPL-MCNC: 8.6 MG/DL — SIGNIFICANT CHANGE UP (ref 8.5–10.1)
CHLORIDE SERPL-SCNC: 106 MMOL/L — SIGNIFICANT CHANGE UP (ref 96–108)
CHLORIDE SERPL-SCNC: 106 MMOL/L — SIGNIFICANT CHANGE UP (ref 96–108)
CO2 SERPL-SCNC: 20 MMOL/L — LOW (ref 22–31)
CO2 SERPL-SCNC: 20 MMOL/L — LOW (ref 22–31)
CREAT SERPL-MCNC: 0.3 MG/DL — LOW (ref 0.5–1.3)
CREAT SERPL-MCNC: 0.3 MG/DL — LOW (ref 0.5–1.3)
EGFR: 98 ML/MIN/1.73M2 — SIGNIFICANT CHANGE UP
EGFR: 98 ML/MIN/1.73M2 — SIGNIFICANT CHANGE UP
FERRITIN SERPL-MCNC: 124 NG/ML — SIGNIFICANT CHANGE UP (ref 13–330)
FERRITIN SERPL-MCNC: 124 NG/ML — SIGNIFICANT CHANGE UP (ref 13–330)
FOLATE SERPL-MCNC: 5.6 NG/ML — SIGNIFICANT CHANGE UP
FOLATE SERPL-MCNC: 5.6 NG/ML — SIGNIFICANT CHANGE UP
GLUCOSE SERPL-MCNC: 84 MG/DL — SIGNIFICANT CHANGE UP (ref 70–99)
GLUCOSE SERPL-MCNC: 84 MG/DL — SIGNIFICANT CHANGE UP (ref 70–99)
HCT VFR BLD CALC: 23.4 % — LOW (ref 34.5–45)
HCT VFR BLD CALC: 23.4 % — LOW (ref 34.5–45)
HGB BLD-MCNC: 6.8 G/DL — CRITICAL LOW (ref 11.5–15.5)
HGB BLD-MCNC: 6.8 G/DL — CRITICAL LOW (ref 11.5–15.5)
IRON SATN MFR SERPL: 14 UG/DL — LOW (ref 30–160)
IRON SATN MFR SERPL: 14 UG/DL — LOW (ref 30–160)
IRON SATN MFR SERPL: 7 % — LOW (ref 14–50)
IRON SATN MFR SERPL: 7 % — LOW (ref 14–50)
MAGNESIUM SERPL-MCNC: 2.1 MG/DL — SIGNIFICANT CHANGE UP (ref 1.6–2.6)
MAGNESIUM SERPL-MCNC: 2.1 MG/DL — SIGNIFICANT CHANGE UP (ref 1.6–2.6)
MCHC RBC-ENTMCNC: 22.7 PG — LOW (ref 27–34)
MCHC RBC-ENTMCNC: 22.7 PG — LOW (ref 27–34)
MCHC RBC-ENTMCNC: 29.1 G/DL — LOW (ref 32–36)
MCHC RBC-ENTMCNC: 29.1 G/DL — LOW (ref 32–36)
MCV RBC AUTO: 78 FL — LOW (ref 80–100)
MCV RBC AUTO: 78 FL — LOW (ref 80–100)
NRBC # BLD: 0 /100 WBCS — SIGNIFICANT CHANGE UP (ref 0–0)
NRBC # BLD: 0 /100 WBCS — SIGNIFICANT CHANGE UP (ref 0–0)
PHOSPHATE SERPL-MCNC: 3 MG/DL — SIGNIFICANT CHANGE UP (ref 2.5–4.5)
PHOSPHATE SERPL-MCNC: 3 MG/DL — SIGNIFICANT CHANGE UP (ref 2.5–4.5)
PLATELET # BLD AUTO: 452 K/UL — HIGH (ref 150–400)
PLATELET # BLD AUTO: 452 K/UL — HIGH (ref 150–400)
POTASSIUM SERPL-MCNC: 3.1 MMOL/L — LOW (ref 3.5–5.3)
POTASSIUM SERPL-MCNC: 3.1 MMOL/L — LOW (ref 3.5–5.3)
POTASSIUM SERPL-SCNC: 3.1 MMOL/L — LOW (ref 3.5–5.3)
POTASSIUM SERPL-SCNC: 3.1 MMOL/L — LOW (ref 3.5–5.3)
PROT SERPL-MCNC: 6.8 GM/DL — SIGNIFICANT CHANGE UP (ref 6–8.3)
PROT SERPL-MCNC: 6.8 GM/DL — SIGNIFICANT CHANGE UP (ref 6–8.3)
RBC # BLD: 3 M/UL — LOW (ref 3.8–5.2)
RBC # BLD: 3 M/UL — LOW (ref 3.8–5.2)
RBC # FLD: 20.4 % — HIGH (ref 10.3–14.5)
RBC # FLD: 20.4 % — HIGH (ref 10.3–14.5)
SODIUM SERPL-SCNC: 137 MMOL/L — SIGNIFICANT CHANGE UP (ref 135–145)
SODIUM SERPL-SCNC: 137 MMOL/L — SIGNIFICANT CHANGE UP (ref 135–145)
TIBC SERPL-MCNC: 205 UG/DL — LOW (ref 220–430)
TIBC SERPL-MCNC: 205 UG/DL — LOW (ref 220–430)
UIBC SERPL-MCNC: 191 UG/DL — SIGNIFICANT CHANGE UP (ref 110–370)
UIBC SERPL-MCNC: 191 UG/DL — SIGNIFICANT CHANGE UP (ref 110–370)
VIT B12 SERPL-MCNC: 1279 PG/ML — HIGH (ref 232–1245)
VIT B12 SERPL-MCNC: 1279 PG/ML — HIGH (ref 232–1245)
WBC # BLD: 11.44 K/UL — HIGH (ref 3.8–10.5)
WBC # BLD: 11.44 K/UL — HIGH (ref 3.8–10.5)
WBC # FLD AUTO: 11.44 K/UL — HIGH (ref 3.8–10.5)
WBC # FLD AUTO: 11.44 K/UL — HIGH (ref 3.8–10.5)

## 2023-10-27 PROCEDURE — 71045 X-RAY EXAM CHEST 1 VIEW: CPT | Mod: 26

## 2023-10-27 PROCEDURE — 99232 SBSQ HOSP IP/OBS MODERATE 35: CPT

## 2023-10-27 PROCEDURE — 99223 1ST HOSP IP/OBS HIGH 75: CPT

## 2023-10-27 PROCEDURE — 95816 EEG AWAKE AND DROWSY: CPT | Mod: 26

## 2023-10-27 PROCEDURE — 99497 ADVNCD CARE PLAN 30 MIN: CPT | Mod: 25

## 2023-10-27 RX ORDER — ACETAMINOPHEN 500 MG
650 TABLET ORAL EVERY 6 HOURS
Refills: 0 | Status: DISCONTINUED | OUTPATIENT
Start: 2023-10-27 | End: 2023-10-31

## 2023-10-27 RX ORDER — IRON SUCROSE 20 MG/ML
200 INJECTION, SOLUTION INTRAVENOUS ONCE
Refills: 0 | Status: COMPLETED | OUTPATIENT
Start: 2023-10-27 | End: 2023-10-27

## 2023-10-27 RX ORDER — POTASSIUM CHLORIDE 20 MEQ
40 PACKET (EA) ORAL EVERY 4 HOURS
Refills: 0 | Status: DISCONTINUED | OUTPATIENT
Start: 2023-10-27 | End: 2023-10-27

## 2023-10-27 RX ORDER — POTASSIUM CHLORIDE 20 MEQ
10 PACKET (EA) ORAL
Refills: 0 | Status: COMPLETED | OUTPATIENT
Start: 2023-10-27 | End: 2023-10-27

## 2023-10-27 RX ORDER — KETOROLAC TROMETHAMINE 30 MG/ML
15 SYRINGE (ML) INJECTION ONCE
Refills: 0 | Status: DISCONTINUED | OUTPATIENT
Start: 2023-10-27 | End: 2023-10-27

## 2023-10-27 RX ORDER — PANTOPRAZOLE SODIUM 20 MG/1
40 TABLET, DELAYED RELEASE ORAL DAILY
Refills: 0 | Status: DISCONTINUED | OUTPATIENT
Start: 2023-10-27 | End: 2023-10-31

## 2023-10-27 RX ORDER — METOPROLOL TARTRATE 50 MG
5 TABLET ORAL EVERY 6 HOURS
Refills: 0 | Status: DISCONTINUED | OUTPATIENT
Start: 2023-10-27 | End: 2023-10-31

## 2023-10-27 RX ORDER — ACETAMINOPHEN 500 MG
1000 TABLET ORAL ONCE
Refills: 0 | Status: COMPLETED | OUTPATIENT
Start: 2023-10-27 | End: 2023-10-27

## 2023-10-27 RX ORDER — SODIUM CHLORIDE 9 MG/ML
1000 INJECTION, SOLUTION INTRAVENOUS
Refills: 0 | Status: DISCONTINUED | OUTPATIENT
Start: 2023-10-27 | End: 2023-10-29

## 2023-10-27 RX ADMIN — CEFTRIAXONE 100 MILLIGRAM(S): 500 INJECTION, POWDER, FOR SOLUTION INTRAMUSCULAR; INTRAVENOUS at 17:18

## 2023-10-27 RX ADMIN — Medication 1000 MILLIGRAM(S): at 15:20

## 2023-10-27 RX ADMIN — Medication 400 MILLIGRAM(S): at 14:31

## 2023-10-27 RX ADMIN — Medication 100 MILLIEQUIVALENT(S): at 15:27

## 2023-10-27 RX ADMIN — Medication 15 MILLIGRAM(S): at 22:12

## 2023-10-27 RX ADMIN — Medication 125 MILLIGRAM(S): at 17:20

## 2023-10-27 RX ADMIN — Medication 100 MILLIEQUIVALENT(S): at 12:18

## 2023-10-27 RX ADMIN — Medication 100 MILLIEQUIVALENT(S): at 14:15

## 2023-10-27 RX ADMIN — Medication 15 MILLIGRAM(S): at 23:15

## 2023-10-27 RX ADMIN — Medication 100 MILLIEQUIVALENT(S): at 17:17

## 2023-10-27 RX ADMIN — IRON SUCROSE 110 MILLIGRAM(S): 20 INJECTION, SOLUTION INTRAVENOUS at 22:12

## 2023-10-27 RX ADMIN — Medication 100 MILLIEQUIVALENT(S): at 11:27

## 2023-10-27 RX ADMIN — ENOXAPARIN SODIUM 40 MILLIGRAM(S): 100 INJECTION SUBCUTANEOUS at 17:20

## 2023-10-27 NOTE — CONSULT NOTE ADULT - PROBLEM SELECTOR RECOMMENDATION 2
local care as ordered, turn and position q 2 hours failed swallow eval, recommending NPO, tongue swollen and patient lethargic, can consider reeval if either improve   HOBE and oral hygiene

## 2023-10-27 NOTE — CONSULT NOTE ADULT - SUBJECTIVE AND OBJECTIVE BOX
HPI:  Talked to daughter Katlyn ( [phone])  89 years old female with h/o HTN, HLD, GERD, decubitus ulcer was brought in to ED with concern for AMS. Daughter noted patient appear confused today AM with some blood around the tongue. Patient reported dysuria for last few days. No nausea or vomiting. Patient is bedbound since around 08/2023. Patient also appear to have some dysphagia and is currently only able to eat puree diet.   Febrile to 101.7, tachycardic upon arrival. WBC 14.29, plt 489, Hb 7.4, K 4.4, Cr 0.48, hsTnT 17.1, lactate 1.5. UA positive for UTI. RVP negative. CT head with no acute pathology. Mild to mod chronic microvascular changes. CXR with no focal consolidation    SH: no toxic habits (26 Oct 2023 16:27)    PERTINENT PM/SXH:   Hypertension    High cholesterol        FAMILY HISTORY:    ITEMS NOT CHECKED ARE NOT PRESENT    SOCIAL HISTORY:   Significant other/partner:  [ ]  Children: yes  [ ]  Scientologist/Spirituality: Latter day  Substance hx:  [ ]   Tobacco hx:  [ ]   Alcohol hx: [ ]   Home Opioid hx:  [ ] I-Stop Reference No: Reference #: [id number]  Living Situation: [x ]Home  [ ]Long term care  [ ]Rehab [ ]Other    ADVANCE DIRECTIVES:    DNR  MOLST  [ ]  Living Will  [ ]   DECISION MAKER(s):  [ ] Health Care Proxy(s)  [x ] Surrogate(s)  [ ] Guardian           Name(s): Phone Number(s): Katlyn Contreras [phone]    BASELINE (I)ADL(s) (prior to admission):  [place]: [ ]Total  [x ] Moderate [ ]Dependent    Allergies    No Known Allergies    Intolerances    MEDICATIONS  (STANDING):  amLODIPine   Tablet 5 milliGRAM(s) Oral daily  aspirin enteric coated 81 milliGRAM(s) Oral daily  atorvastatin 20 milliGRAM(s) Oral at bedtime  cefTRIAXone   IVPB 1000 milliGRAM(s) IV Intermittent every 24 hours  enoxaparin Injectable 40 milliGRAM(s) SubCutaneous every 24 hours  lactated ringers. 1000 milliLiter(s) (75 mL/Hr) IV Continuous <Continuous>  pantoprazole    Tablet 40 milliGRAM(s) Oral before breakfast  potassium chloride  10 mEq/100 mL IVPB 10 milliEquivalent(s) IV Intermittent every 1 hour    MEDICATIONS  (PRN):  acetaminophen   Oral Liquid .. 650 milliGRAM(s) Oral every 6 hours PRN Temp greater or equal to 38C (100.4F), Mild Pain (1 - 3), Moderate Pain (4 - 6)  melatonin 3 milliGRAM(s) Oral at bedtime PRN Insomnia  morphine  - Injectable 1 milliGRAM(s) IV Push every 6 hours PRN Severe Pain (7 - 10)    PRESENT SYMPTOMS: [x ]Unable to obtain due to poor mentation   Source if other than patient:  [ ]Family   [ ]Team     Pain: [ ] yes [ ] no  QOL impact -   Location -                    Aggravating factors -  Quality -  Radiation -  Timing-  Severity (0-10 scale):  Minimal acceptable level (0-10 scale):     PAIN AD Score:     [email or web address] (press ctrl +  left click to view)    Dyspnea:                           [ ]Mild [ ]Moderate [ ]Severe  Anxiety:                             [ ]Mild [ ]Moderate [ ]Severe  Fatigue:                             [ ]Mild [ ]Moderate [ ]Severe  Nausea:                             [ ]Mild [ ]Moderate [ ]Severe  Loss of appetite:              [ ]Mild [ ]Moderate [ ]Severe  Constipation:                    [ ]Mild [ ]Moderate [ ]Severe    Other Symptoms:  [ ]All other review of systems negative     Karnofsky Performance Score/Palliative Performance Status Version 2:       30  %    http://npcrc.org/files/news/palliative_performance_scale_ppsv2.pdf  PHYSICAL EXAM:  Vital Signs Last 24 Hrs  T(C): 36.7 (27 Oct 2023 11:18), Max: 37.5 (26 Oct 2023 13:15)  T(F): 98 (27 Oct 2023 11:18), Max: 99.5 (26 Oct 2023 13:15)  HR: 96 (27 Oct 2023 11:18) (85 - 99)  BP: 119/66 (27 Oct 2023 11:18) (93/50 - 143/63)  BP(mean): --  RR: 18 (27 Oct 2023 11:18) (18 - 22)  SpO2: 96% (27 Oct 2023 11:18) (96% - 100%)    Parameters below as of 27 Oct 2023 04:58  Patient On (Oxygen Delivery Method): nasal cannula  O2 Flow (L/min): 3   I&O's Summary  GENERAL:  [ ]Alert  [ ]Oriented x   [x ]Lethargic  [ ]Cachexia  [ ]Unarousable  [ ]Verbal  [ ]Non-Verbal  tongue protruding able to nod head  Behavioral:   [ ] Anxiety  [ ] Delirium [ ] Agitation [ ] Other  HEENT:  [ ]Normal   [ ]Dry mouth   [ ]ET Tube/Trach  [ ]Oral lesions tongue swollen and protruding  PULMONARY:   [x ]Clear diminished bilateral lower lobes  [ ]Tachypnea  [ ]Audible excessive secretions   [ ]Rhonchi        [ ]Right [ ]Left [ ]Bilateral  [ ]Crackles        [ ]Right [ ]Left [ ]Bilateral  [ ]Wheezing     [ ]Right [ ]Left [ ]Bilateral  CARDIOVASCULAR:    [ x]Regular [ ]Irregular [ ]Tachy  [ ]Darren [ ]Murmur [ ]Other  GASTROINTESTINAL:  [ x]Soft  [ ]Distended   [ ]+BS  [ x]Non tender [ ]Tender  [ ]PEG [ ]OGT/ NGT  Last BM: prior to admission GENITOURINARY:  [ ]Normal [ x] Incontinent   [ ]Oliguria/Anuria   [ ][place]  MUSCULOSKELETAL:   [ ]Normal   [ ]Weakness  [ x]Bed/Wheelchair bound [ x]Edema bilateral feet   NEUROLOGIC:   [ ]No focal deficits  [x ] Cognitive impairment  [ ] Dysphagia [ ]Dysarthria [ ] Paresis [ ]Other   SKIN:   [ ]Normal   [ ]Pressure ulcer(s)  [ ]Rash    CRITICAL CARE:  [ ] Shock Present  [ ]Septic [ ]Cardiogenic [ ]Neurologic [ ]Hypovolemic  [ ]  Vasopressors [ ]  Inotropes   [ ] Respiratory failure present [ ] mechanical ventilation [ ] non-invasive ventilatory support [ ] High flow  [ ] Acute  [ ] Chronic [ ] Hypoxic  [ ] Hypercarbic [ ] Other  [ ] Other organ failure     LABS:                        6.8    11.44 )-----------( 452      ( 27 Oct 2023 07:03 )             23.4   10-27    137  |  106  |  6<L>  ----------------------------<  84  3.1<L>   |  20<L>  |  0.30<L>    Ca    8.6      27 Oct 2023 07:03  Phos  3.0     10-27  Mg     2.1     10-27    TPro  6.8  /  Alb  2.1<L>  /  TBili  0.4  /  DBili  x   /  AST  25  /  ALT  18  /  AlkPhos  110  10-27  PT/INR - ( 26 Oct 2023 11:10 )   PT: 13.4 sec;   INR: 1.13 ratio         PTT - ( 26 Oct 2023 11:10 )  PTT:28.0 sec    Urinalysis (10.26.23 @ 12:00)    Glucose Qualitative, Urine: Negative mg/dL   Blood, Urine: Negative   pH Urine: 8.0   Color: Yellow   Urine Appearance: Slightly Turbid   Bilirubin: Negative   Ketone - Urine: Negative   Specific Gravity: 1.010   Protein, Urine: Negative mg/dL   Urobilinogen: Negative mg/dL   Nitrite: Positive   Leukocyte Esterase Concentration: Trace    RADIOLOGY & ADDITIONAL STUDIES: < from: CT Head No Cont (10.26.23 @ 12:21) >  IMPRESSION:  Mild to moderate chronic microvascular changes without   evidence of an acute transcortical infarction or hemorrhage.  --- End of Report ---  < end of copied text >    < from: Xray Chest 1 View-PORTABLE IMMEDIATE (10.26.23 @ 11:28) >  IMPRESSION:  Mildly elevated right hemidiaphragm.  No focal lung consolidation.  -- End of Report ---  < end of copied text >    EEG IMPRESSION/CLINICAL CORRELATE  Abnormal EEG study.  Mild to Moderate nonspecific diffuse or multifocal cerebral dysfunction.   No epileptiform pattern or seizure seen.  Ray Cross MD  EEG/Epilepsy Attending    PROTEIN CALORIE MALNUTRITION PRESENT: [ ] Yes [ ] No  [ ] PPSV2 < or = to 30% [ ] significant weight loss  [ ] poor nutritional intake [ ] catabolic state [ ] anasarca     Artificial Nutrition [ ]     REFERRALS:   [ ]Chaplaincy  [ ] Hospice  [ ]Child Life  [ ]Social Work  [ ]Case management [ ]Holistic Therapy     Goals of Care Document:   Care Coordination Assessment 201 [C. Provider] (10-26-23 @ 15:10)

## 2023-10-27 NOTE — DIETITIAN INITIAL EVALUATION ADULT - PERTINENT LABORATORY DATA
10-27    137  |  106  |  6<L>  ----------------------------<  84  3.1<L>   |  20<L>  |  0.30<L>    Ca    8.6      27 Oct 2023 07:03  Phos  3.0     10-27  Mg     2.1     10-27    TPro  6.8  /  Alb  2.1<L>  /  TBili  0.4  /  DBili  x   /  AST  25  /  ALT  18  /  AlkPhos  110  10-27

## 2023-10-27 NOTE — DIETITIAN INITIAL EVALUATION ADULT - NUTRITIONGOAL OUTCOME1
Pt to meet >50% energy/protein needs (once determined) via tolerated route Pt to consume >50% meals/supplements during LOS

## 2023-10-27 NOTE — CONSULT NOTE ADULT - PROBLEM SELECTOR RECOMMENDATION 5
See GOC above.  Patient has 14 children, Jay dyson person who discusses with her siblings.  JARRET drafted indicating DNR.  Family encouraged to discuss about nutrition and intubation.     Message sent to Dr. Drummond

## 2023-10-27 NOTE — PROGRESS NOTE ADULT - ASSESSMENT
89 years old female with h/o HTN, HLD, GERD, decubitus ulcer was brought in to ED with concern for AMS. Daughter noted patient appear confused today AM with some blood around the tongue. Patient reported dysuria for last few days. No nausea or vomiting. Patient is bedbound since around 08/2023. Patient also appear to have some dysphagia and is currently only able to eat puree diet.   Febrile to 101.7, tachycardic upon arrival. WBC 14.29, plt 489, Hb 7.4, K 4.4, Cr 0.48, hsTnT 17.1, lactate 1.5. UA positive for UTI. RVP negative. CT head with no acute pathology. Mild to mod chronic microvascular changes. CXR with no focal consolidation    Assessment and Plan:   Acute metabolic encephalopathy POA  Sepsis POA (tachycardia, 101.7F , leukocytosis), possibly UTI ?   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  transfuse 1u PRBC   venofer x 1   solumedrol x 1 for tongue swelling , toradol x 1   continue with ceftriaxone   follow MRI head , Neuro consulted   PT wound     Preventative Measures   lovenox SQ-dvt ppx  fall, aspiration precautions   HOBE   palliative following, DNR with trial of intubation     will give folic acid once can tolerate PO intake

## 2023-10-27 NOTE — CONSULT NOTE ADULT - PROBLEM SELECTOR RECOMMENDATION 4
See GOC above.  Patient has 14 children, Jay dyson person who discusses with her siblings.  JARRET drafted indicating DNR.  Family encouraged to discuss about nutrition and intubation.     Message sent to Dr. Drummond has been less mobile since August, mainly bedbound, dependent for ADLs  lives at home with her daughter, CDPAP aide 10 hours M-F and 8 hours Saturday-Sunday

## 2023-10-27 NOTE — PROGRESS NOTE ADULT - ASSESSMENT
Bedbound >2 months.    Functionally quadriplegic.      UTI.     Toxic metabolic encephalopathy.     Likely demented at baseline.      Appears to have a R hemiparesis (tongue deviation and R hand weakness).  Age unknown.  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    Cannot exclude possibilities of having had bilateral (small vessel - not seen on CT today)  ischemic infarcts.  Cannot exclude cervical myelopathy (however, there is only one "long tract" sign - the ?extensor L plantar response - which would be unusual for cervical cord compression as in spondylosis).      Seizure seems unlikely.      MR of head or c-spine not expected to .          RECOMMENDATIONS    Methylmalonic acid, homocysteine  The assay for B12 does not measure B12 level directly.  False normal and false elevations (to or above the normal range) occur with pernicious anemia due to intrinsic factor antibodies, which may or may not be detected by antibody tests.  B12-like compounds of vegetable origin (a problem with vegans) without cobalamin activity also can lead to falsely normal or elevated determinations.  Methylmalonic acid (MMA) and homocysteine (Hcy) determinations are necessary to elucidate what is happening.    Irrespective of B12 and folate levels:       high Hcy w normal MMA implies folate deficiency;        high MMA and Hcy implies B12 deficiency +/- folate deficiency.    TSH, RPR .      Non-con c-spine CT.    If no medical contraindication, ASA 81mg daily on suspicion of stroke of undetermined age.  Would not use dual antiplatelet Tx.      Cardiac telemetry.      TTE with "bubble' study.     Bedbound >2 months.    Functionally quadriplegic.      UTI.     Toxic metabolic encephalopathy.     Likely demented at baseline.      Appears to have a R hemiparesis (tongue deviation and R hand weakness).  Age unknown.  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    Cannot exclude possibilities of having had bilateral (small vessel - not seen on CT today)  ischemic infarcts.  Cannot exclude cervical myelopathy (however, there is only one "long tract" sign - the ?extensor L plantar response - which would be unusual for cervical cord compression as in spondylosis).      Seizure seems unlikely.      MR of head or c-spine not expected to .    Can D/C MR brain.      RECOMMENDATIONS    Methylmalonic acid, homocysteine  The assay for B12 does not measure B12 level directly.  False normal and false elevations (to or above the normal range) occur with pernicious anemia due to intrinsic factor antibodies, which may or may not be detected by antibody tests.  B12-like compounds of vegetable origin (a problem with vegans) without cobalamin activity also can lead to falsely normal or elevated determinations.  Methylmalonic acid (MMA) and homocysteine (Hcy) determinations are necessary to elucidate what is happening.    Irrespective of B12 and folate levels:       high Hcy w normal MMA implies folate deficiency;        high MMA and Hcy implies B12 deficiency +/- folate deficiency.    TSH, RPR .      Non-con c-spine CT.    If no medical contraindication, ASA 81mg daily on suspicion of stroke of undetermined age.  Would not use dual antiplatelet Tx.      Cardiac telemetry.      TTE with "bubble' study.

## 2023-10-27 NOTE — PROGRESS NOTE ADULT - SUBJECTIVE AND OBJECTIVE BOX
This is in follow-up to my initial Neurology Consult Note yesterday.  Hx and findings as detailed therein.      Results of recommended tests:    Per report of routine EEG:  "Abnormal EEG study.  Mild to Moderate nonspecific diffuse or multifocal cerebral dysfunction.   No epileptiform pattern or seizure seen."      B12/folate  1279/5.6  Fe/TIBC  14/205   methylmalonic acid (MMA)/ homocysteine (Hcy)  apparently not ordered   TSH  apparently not ordered   RPR  apparently not ordered.      Non-con c-spine CT.  Apparently not ordered.    TTE with "bubble' study.  Apparently not ordered.

## 2023-10-27 NOTE — SWALLOW BEDSIDE ASSESSMENT ADULT - SLP PERTINENT HISTORY OF CURRENT PROBLEM
Sepsis due to UTI,  tongue swelling; confused today AM with some blood around the tongue; Patient reported dysuria for last few days

## 2023-10-27 NOTE — SWALLOW BEDSIDE ASSESSMENT ADULT - ORAL PHASE
Decreased anterior-posterior movement of the bolus/Delayed oral transit time/Stasis in anterior sulcus/Stasis in lateral sulci Decreased anterior-posterior movement of the bolus/Delayed oral transit time/Stasis in lateral sulci/Lingual stasis

## 2023-10-27 NOTE — SWALLOW BEDSIDE ASSESSMENT ADULT - SLP GENERAL OBSERVATIONS
trunk and head position is generally tilted towards right side alert , minimally verbal ; speech clarity is poor due to enlarged tongue; limited ability to produce simple oromotor movements but did demonstrate understanding; trunk and head generally tilted towards right side and required manual assist with positioning for all tasks

## 2023-10-27 NOTE — SWALLOW BEDSIDE ASSESSMENT ADULT - NS ASR SWALLOW FINDINGS DISCUS
CHONG Arredondo RN ; granddaughter/Physician/Nursing/Patient/Family CHONG Arredondo RN ; granddaughter;  RAMSEY Frank/Physician/Nursing/Patient/Family

## 2023-10-27 NOTE — SWALLOW BEDSIDE ASSESSMENT ADULT - DIET PRIOR TO ADMI
granddaughter present reports puree foods x 6 months and thin liquids via straw at home granddaughter present reports puree foods over last 6 months and thin liquids via straw at home

## 2023-10-27 NOTE — DIETITIAN INITIAL EVALUATION ADULT - SIGNS/SYMPTOMS
swollen tongue (pt made NPO by SLP for safety) AMS Physical findings severe/moderate fat depletion & mild/moderate muscle wasting

## 2023-10-27 NOTE — EEG REPORT - NS EEG TEXT BOX
[facility]   COMPREHENSIVE EPILEPSY CENTER   REPORT OF ROUTINE EEG W/ Video     [facility]: [address], [place] [address], Ph#: [phone]  [facility]: [address] [facility] [place] [address], Ph#: [phone]  [facility]: [address] [place] [address], Ph#: [phone]    Patient Name: EVAN PRUITT  Age and : 89y ([birth year])  MRN #: [id number]  Location: [facility] 2D 262 D  Referring Physician: Cris Drummond    Study Date: 10-27-23    _____________________________________________________________  TECHNICAL INFORMATION    Placement and Labeling of Electrodes:  The EEG was performed utilizing 20 channels referential EEG connections (coronal over temporal over parasagittal montage) using all standard 10-20 electrode placements with EKG.  Recording was at a sampling rate of 256 samples per second per channel.  Time synchronized digital video recording was done simultaneously with EEG recording.  A low light infrared camera was used for low light recording.  Sukhdev and seizure detection algorithms were utilized.    _____________________________________________________________  HISTORY    Patient is a 89y old  Female who presents with a chief complaint of sepsis due to UTI,  tongue swelling (26 Oct 2023 16:27)      PERTINENT MEDICATION:  MEDICATIONS  (STANDING):  amLODIPine   Tablet 5 milliGRAM(s) Oral daily  aspirin enteric coated 81 milliGRAM(s) Oral daily  atorvastatin 20 milliGRAM(s) Oral at bedtime  cefTRIAXone   IVPB 1000 milliGRAM(s) IV Intermittent every 24 hours  enoxaparin Injectable 40 milliGRAM(s) SubCutaneous every 24 hours  lactated ringers. 1000 milliLiter(s) (75 mL/Hr) IV Continuous <Continuous>  pantoprazole    Tablet 40 milliGRAM(s) Oral before breakfast  potassium chloride   Powder 40 milliEquivalent(s) Oral every 4 hours    _____________________________________________________________  STUDY INTERPRETATION    Findings: The background was continuous, spontaneously variable and reactive. During wakefulness, the posterior dominant rhythm consisted of symmetric, 7Hz activity, with amplitude to 30 uV, that attenuated to eye opening.  Low amplitude frontal beta was noted in wakefulness.    Background Slowing:  Diffuse theta and polymorphic delta slowing.    Focal Slowing:   None were present.    Sleep Background:  Drowsiness was characterized by fragmentation, attenuation, and slowing of the background activity.    Sleep was characterized by the presence of vertex waves, symmetric sleep spindles and K-complexes.    Other Non-Epileptiform Findings:  None were present.    Interictal Epileptiform Activity:   None were present.    Events:  Clinical events: None recorded.  Seizures: None recorded.    Activation Procedures:   Hyperventilation was not performed.    Photic stimulation was not performed.     Artifacts:  Intermittent myogenic and movement artifacts were noted.  _____________________________________________________________  EEG SUMMARY/CLASSIFICATION    Abnormal EEG    - Mild to Moderate generalized slowing.    _____________________________________________________________  EEG IMPRESSION/CLINICAL CORRELATE    Abnormal EEG study.  Mild to Moderate nonspecific diffuse or multifocal cerebral dysfunction.   No epileptiform pattern or seizure seen.    Ray Cross MD  EEG/Epilepsy Attending

## 2023-10-27 NOTE — CONSULT NOTE ADULT - PROBLEM SELECTOR RECOMMENDATION 9
not at baseline mental status, initial CTH unremarkable for acute issues, neuro eval appreciated, MRI would not alter management, on asa and lovenox  EEG with no evidence of seizures, but with Mild to Moderate nonspecific diffuse or multifocal cerebral dysfunction  UA with trace leukocytes and nitrates, leukocytosis and fever, started on abx, urine culture pending, blood cultures sent, follow culture data not at baseline mental status, initial CTH unremarkable for acute issues, neuro eval appreciated, MRI would not alter management, on asa and lovenox  CTH with mild-moderate microvascular changes  EEG with no evidence of seizures, but with Mild to Moderate nonspecific diffuse or multifocal cerebral dysfunction  UA with trace leukocytes and nitrates, leukocytosis and fever, started on abx, urine culture pending, blood cultures sent, follow culture data  failed swallow eval, discussed with daughter, Jay

## 2023-10-27 NOTE — CONSULT NOTE ADULT - CONVERSATION DETAILS
Spoke with patient's daughter, Jay [phone] via phone as she was not available to speak in person.  Patient has 14 children, Jay is the point person and is discussing things with her siblings.  Patient lives at home with her, has CDPAP aide 10 hours M-F and 8 hours Saturday and Sunday.  Patient is dependent for care and has been physically declining since August.   She summarized her understanding clinical issues thus far.  Explained no acute CTH or EEG findings, but possibility patient had a stroke and it was too soon to see on CTH.  They are continuing abx for concern of UTI, but no culture data back.  Asked about wishes regarding LST and she said no CPR and to allow for a peaceful passing, would want a trial of intubation.  We discussed benefits/burdens of enteral nutrition including; aspiration, infection, dislodgement.  Discussed alternative of pleasure feeds acknowledging risk of aspiration, but focus is on quality of life.  She indicated she thinks she would prefer careful handfeeding if patient unable to swallow, but encouraged her to discuss with her family.  Will see how patient does over the weekend and follow up Monday.  Encouraged she speak with her family regarding nutrition and intubation should the need arise.  JARRET drafted with DNR.

## 2023-10-27 NOTE — SWALLOW BEDSIDE ASSESSMENT ADULT - NS SPL SWALLOW CLINIC TRIAL FT
there are airway protection deficits with visible amounts swallowed ; Granddaughter requested try straw drinking since it is her usual at home; pt was unable to manipulate and suction from straw there are airway protection deficits with known amounts swallowed ; Granddaughter requested try straw drinking since it is her usual at home; pt was unable to manipulate and suction from straw

## 2023-10-27 NOTE — SWALLOW BEDSIDE ASSESSMENT ADULT - SWALLOW EVAL: DIAGNOSIS
Oropharyngeal dysphagia with weakness and poor lingual-labial control for bolus in setting of Sepsis and notable enlarged size of tongue Oropharyngeal dysphagia with weakness, poor lingual-labial function, poor management of own secretions in setting of Sepsis/ metabolic encephalopathy; there are airway protection deficits for limited amounts swallowed; there is notably enlarged size of tongue affecting speech and swallow functions at this time Oropharyngeal dysphagia with weakness, poor lingual-labial function, poor management of own secretions in setting of Sepsis/ metabolic encephalopathy; there are airway protection deficits for limited amounts swallowed; there is notably enlarged body of tongue affecting speech and swallow functions at this time

## 2023-10-27 NOTE — SWALLOW BEDSIDE ASSESSMENT ADULT - POSITIONING
needs constant head support to maintain neutral position/upright (90 degrees) needs head support for neutral position/upright (90 degrees)

## 2023-10-27 NOTE — SWALLOW BEDSIDE ASSESSMENT ADULT - PHARYNGEAL PHASE
Delayed pharyngeal swallow/Decreased laryngeal elevation/Wet vocal quality post oral intake Delayed pharyngeal swallow/Decreased laryngeal elevation/Wet vocal quality post oral intake/Throat clear post oral intake/Delayed throat clear post oral intake

## 2023-10-27 NOTE — SWALLOW BEDSIDE ASSESSMENT ADULT - ADDITIONAL RECOMMENDATIONS
Encourage stimulation via open/close mouth/jaw , protrusion of tongue 2-3 in row;  Encourage dry /saliva swallows as tolerated by pt

## 2023-10-27 NOTE — CONSULT NOTE ADULT - PROBLEM SELECTOR RECOMMENDATION 3
has been less mobile since fall, mainly bedbound   lives at home with her daughter has been less mobile since August, mainly bedbound, dependent for ADLs  lives at home with her daughter, CDPAP aide 10 hours M-F and 8 hours Saturday-Sunday local care as ordered, turn and position q 2 hours

## 2023-10-27 NOTE — SWALLOW BEDSIDE ASSESSMENT ADULT - ORAL PREPARATORY PHASE
Reduced oral grading/Anterior loss of bolus/Lateral loss of bolus/Bolus falls into anterior sulcus/Bolus falls into right lateral sulci

## 2023-10-27 NOTE — CONSULT NOTE ADULT - ASSESSMENT
89 year old female PMH of HTN and HLD presented to the ED for AMS.  Patient started on abx for UTI.  Patient not at baseline mental status, no acute findings on CTH, but concern for possible infarct.  Palliative care consulted for GOC.

## 2023-10-27 NOTE — DIETITIAN INITIAL EVALUATION ADULT - ETIOLOGY
Decreased ability to consume sufficient energy Inadequate energy/protein intake + increased needs related to AMS, difficulty swallowing, debility, pressure ulcer

## 2023-10-27 NOTE — SWALLOW BEDSIDE ASSESSMENT ADULT - COMMENTS
PMH HTN, HLD, GERD, decubitus ulcer  10/26 H&P note Small area of bleeding noted on floor of tongue near frenulum area, likely due to lower teeth deformity putting chronic pressure on bottom of tongue ; No other area of tongue trauma noted. No stridor;  Tongue swelling likely related to trauma from lower teeth  10/26 CT brain Mild to moderate chronic microvascular changes without evidence of an acute transcortical infarction or hemorrhage.  CXR  Mildly elevated right hemidiaphragm;  No focal lung consolidation.  10/26 Neuro note Bedbound >2 months;  Functionally quadriplegic; toxic metabolic encephalopathy; Likely demented at baseline;  Appears to have a R hemiparesis (tongue deviation and R hand weakness).  Age unknown.  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 oral residue on posterior tongue was removed manually

## 2023-10-27 NOTE — PROGRESS NOTE ADULT - SUBJECTIVE AND OBJECTIVE BOX
PROGRESS NOTE:     Patient is a 89y old  Female who presents with a chief complaint of SEPSIS     (27 Oct 2023 15:12)        SUBJECTIVE & OBJECTIVE:   Pt seen and examined at bedside in AM    no overnight events.     ROS: unable to examine due to [ ] Encephalopathy  [ ] Advanced Dementia  [ ] Expressive Aphasia  [ x] Non-verbal patient due to tongue swelling     REVIEW OF SYSTEMS: remaining ROS negative     PHYSICAL EXAM:  T(C): 37.5 (10-27-23 @ 15:42), Max: 38.2 (10-27-23 @ 13:55)  HR: 77 (10-27-23 @ 15:42) (77 - 100)  BP: 110/55 (10-27-23 @ 15:42) (93/50 - 143/63)  RR: 18 (10-27-23 @ 15:42) (18 - 18)  SpO2: 99% (10-27-23 @ 15:42) (95% - 100%)  Wt(kg): --     I&O's Detail        GENERAL: NAD,  no increased WOB  HEAD:  Atraumatic, Normocephalic  EYES: EOMI, PERRLA, conjunctiva and sclera clear  ENMT: Moist mucous membranes  NECK: Supple, No JVD, NO STRIDOR   NERVOUS SYSTEM:  awake but sleepy, weak appearing, moving extremities   CHEST/LUNG: Clear to auscultation bilaterally; No rales, rhonchi, wheezing, or rubs  HEART: Regular rate and rhythm; No murmurs, rubs, or gallops  ABDOMEN: Soft, Nontender, Nondistended; Bowel sounds present  EXTREMITIES:  2+ Peripheral Pulses b/l, No clubbing, cyanosis, calf tenderness, calf tenderness  or edema b/l    MEDICATIONS  (STANDING):  cefTRIAXone   IVPB 1000 milliGRAM(s) IV Intermittent every 24 hours  dextrose 5%. 1000 milliLiter(s) (35 mL/Hr) IV Continuous <Continuous>  enoxaparin Injectable 40 milliGRAM(s) SubCutaneous every 24 hours  iron sucrose IVPB 200 milliGRAM(s) IV Intermittent once  ketorolac   Injectable 15 milliGRAM(s) IV Push once  pantoprazole  Injectable 40 milliGRAM(s) IV Push daily    MEDICATIONS  (PRN):  acetaminophen  Suppository .. 650 milliGRAM(s) Rectal every 6 hours PRN Temp greater or equal to 38.5C (101.3F), Mild Pain (1 - 3)  melatonin 3 milliGRAM(s) Oral at bedtime PRN Insomnia  metoprolol tartrate Injectable 5 milliGRAM(s) IV Push every 6 hours PRN if SBP >160 or HR >100      LABS:                        6.8    11.44 )-----------( 452      ( 27 Oct 2023 07:03 )             23.4     10-27    137  |  106  |  6<L>  ----------------------------<  84  3.1<L>   |  20<L>  |  0.30<L>    Ca    8.6      27 Oct 2023 07:03  Phos  3.0     10-27  Mg     2.1     10-27    TPro  6.8  /  Alb  2.1<L>  /  TBili  0.4  /  DBili  x   /  AST  25  /  ALT  18  /  AlkPhos  110  10-27    PT/INR - ( 26 Oct 2023 11:10 )   PT: 13.4 sec;   INR: 1.13 ratio         PTT - ( 26 Oct 2023 11:10 )  PTT:28.0 sec  Urinalysis Basic - ( 27 Oct 2023 07:03 )    Color: x / Appearance: x / SG: x / pH: x  Gluc: 84 mg/dL / Ketone: x  / Bili: x / Urobili: x   Blood: x / Protein: x / Nitrite: x   Leuk Esterase: x / RBC: x / WBC x   Sq Epi: x / Non Sq Epi: x / Bacteria: x      Magnesium: 2.1 mg/dL (10-27 @ 07:03)    CAPILLARY BLOOD GLUCOSE                RECENT CULTURES:  Urine culture:  10-26 @ 11:44 --   No growth at 24 hours  Urine culture:  10-26 @ 11:10 --   No growth at 24 hours    .Blood Blood-Peripheral  10-26 @ 11:44   No growth at 24 hours  --  --      .Blood Blood-Peripheral  10-26 @ 11:10   No growth at 24 hours  --  --            RADIOLOGY & ADDITIONAL TESTS:          Radiology reports read and imaging reviewed  :  [ x] YES  [ ] NO  (I am not a radiologist and therefore rely on Radiologist reports to facilitate with diagnosis and treatment plans)    Consultant(s) Notes Reviewed:  [x ] YES  [ ] NO    Care Discussed with Consultants/Other Providers [x ] YES  [ ] NO  Care plan and all findings were discussed in detail with patient, daughter Katlyn and granddaughter.  All questions and concerns addressed

## 2023-10-27 NOTE — DIETITIAN INITIAL EVALUATION ADULT - OTHER INFO
Unable to interview pt due to cognitive impairment.  Pt lives a home c family; 2 of her daughters are her CDPAP aide. Family reports that at home pt was being fed pureed consistency c thin liquids. Per swallow eval earlier today, SLP recommends NPO due to swollen tongue; likely related to trauma from lower teeth;  lower teeth deformity putting chronic pressure on bottom of tongue.  Family reports pt health has been declining since this past August; has been bedbound since then. Palliative care in discussions c family regarding GOC; made pt DNR; will see how pt proceeds over this weekend & continue discussions after that.  No reports of any N/V/C/D. Per neurology note pt most likely c dementia; pt appears c right-sided hemiparesis c tongue deviation & right hand weakness; suspect ischemic stroke at some point; work-up in progress.   Unable to interview pt due to cognitive impairment; spoke to daughter who was bedside at time of visit,  Pt lives a home c family; 2 of her daughters are her CDPAP aide. Family reports that at home pt was being fed pureed consistency c thin liquids. Per swallow eval earlier today, SLP recommends NPO due to swollen tongue; likely related to trauma from lower teeth;  lower teeth deformity putting chronic pressure on bottom of tongue.  Family reports pt health has been declining since this past August; has been bedbound since then; wt has been stable has been consuming pureed consistency at home recently. No height noted in header; daughter reports pt is 64".  Palliative care in discussions c family regarding GOC; made pt DNR; will see how pt proceeds over this weekend & continue discussions after that.  No reports of any N/V/C/D. Per neurology note pt most likely c dementia; pt appears c right-sided hemiparesis c tongue deviation & right hand weakness; suspect ischemic stroke at some point; work-up in progress.  Daughter receptive to nutritional supplement; discussed importance of additional protein intake to promote wound healing.

## 2023-10-28 LAB
A1C WITH ESTIMATED AVERAGE GLUCOSE RESULT: 5.4 % — SIGNIFICANT CHANGE UP (ref 4–5.6)
A1C WITH ESTIMATED AVERAGE GLUCOSE RESULT: 5.4 % — SIGNIFICANT CHANGE UP (ref 4–5.6)
ANION GAP SERPL CALC-SCNC: 9 MMOL/L — SIGNIFICANT CHANGE UP (ref 5–17)
ANION GAP SERPL CALC-SCNC: 9 MMOL/L — SIGNIFICANT CHANGE UP (ref 5–17)
BUN SERPL-MCNC: 9 MG/DL — SIGNIFICANT CHANGE UP (ref 7–23)
BUN SERPL-MCNC: 9 MG/DL — SIGNIFICANT CHANGE UP (ref 7–23)
CALCIUM SERPL-MCNC: 8.6 MG/DL — SIGNIFICANT CHANGE UP (ref 8.5–10.1)
CALCIUM SERPL-MCNC: 8.6 MG/DL — SIGNIFICANT CHANGE UP (ref 8.5–10.1)
CHLORIDE SERPL-SCNC: 108 MMOL/L — SIGNIFICANT CHANGE UP (ref 96–108)
CHLORIDE SERPL-SCNC: 108 MMOL/L — SIGNIFICANT CHANGE UP (ref 96–108)
CHOLEST SERPL-MCNC: 139 MG/DL — SIGNIFICANT CHANGE UP
CHOLEST SERPL-MCNC: 139 MG/DL — SIGNIFICANT CHANGE UP
CO2 SERPL-SCNC: 21 MMOL/L — LOW (ref 22–31)
CO2 SERPL-SCNC: 21 MMOL/L — LOW (ref 22–31)
CREAT SERPL-MCNC: 0.29 MG/DL — LOW (ref 0.5–1.3)
CREAT SERPL-MCNC: 0.29 MG/DL — LOW (ref 0.5–1.3)
EGFR: 98 ML/MIN/1.73M2 — SIGNIFICANT CHANGE UP
EGFR: 98 ML/MIN/1.73M2 — SIGNIFICANT CHANGE UP
ESTIMATED AVERAGE GLUCOSE: 108 MG/DL — SIGNIFICANT CHANGE UP (ref 68–114)
ESTIMATED AVERAGE GLUCOSE: 108 MG/DL — SIGNIFICANT CHANGE UP (ref 68–114)
GLUCOSE SERPL-MCNC: 142 MG/DL — HIGH (ref 70–99)
GLUCOSE SERPL-MCNC: 142 MG/DL — HIGH (ref 70–99)
HCT VFR BLD CALC: 26.8 % — LOW (ref 34.5–45)
HCT VFR BLD CALC: 26.8 % — LOW (ref 34.5–45)
HDLC SERPL-MCNC: 42 MG/DL — LOW
HDLC SERPL-MCNC: 42 MG/DL — LOW
HGB BLD-MCNC: 8.2 G/DL — LOW (ref 11.5–15.5)
HGB BLD-MCNC: 8.2 G/DL — LOW (ref 11.5–15.5)
LIPID PNL WITH DIRECT LDL SERPL: 80 MG/DL — SIGNIFICANT CHANGE UP
LIPID PNL WITH DIRECT LDL SERPL: 80 MG/DL — SIGNIFICANT CHANGE UP
MCHC RBC-ENTMCNC: 24 PG — LOW (ref 27–34)
MCHC RBC-ENTMCNC: 24 PG — LOW (ref 27–34)
MCHC RBC-ENTMCNC: 30.6 G/DL — LOW (ref 32–36)
MCHC RBC-ENTMCNC: 30.6 G/DL — LOW (ref 32–36)
MCV RBC AUTO: 78.6 FL — LOW (ref 80–100)
MCV RBC AUTO: 78.6 FL — LOW (ref 80–100)
NON HDL CHOLESTEROL: 97 MG/DL — SIGNIFICANT CHANGE UP
NON HDL CHOLESTEROL: 97 MG/DL — SIGNIFICANT CHANGE UP
NRBC # BLD: 0 /100 WBCS — SIGNIFICANT CHANGE UP (ref 0–0)
NRBC # BLD: 0 /100 WBCS — SIGNIFICANT CHANGE UP (ref 0–0)
PLATELET # BLD AUTO: 406 K/UL — HIGH (ref 150–400)
PLATELET # BLD AUTO: 406 K/UL — HIGH (ref 150–400)
POTASSIUM SERPL-MCNC: 3.3 MMOL/L — LOW (ref 3.5–5.3)
POTASSIUM SERPL-MCNC: 3.3 MMOL/L — LOW (ref 3.5–5.3)
POTASSIUM SERPL-SCNC: 3.3 MMOL/L — LOW (ref 3.5–5.3)
POTASSIUM SERPL-SCNC: 3.3 MMOL/L — LOW (ref 3.5–5.3)
RBC # BLD: 3.41 M/UL — LOW (ref 3.8–5.2)
RBC # BLD: 3.41 M/UL — LOW (ref 3.8–5.2)
RBC # FLD: 19.2 % — HIGH (ref 10.3–14.5)
RBC # FLD: 19.2 % — HIGH (ref 10.3–14.5)
SODIUM SERPL-SCNC: 138 MMOL/L — SIGNIFICANT CHANGE UP (ref 135–145)
SODIUM SERPL-SCNC: 138 MMOL/L — SIGNIFICANT CHANGE UP (ref 135–145)
TRIGL SERPL-MCNC: 87 MG/DL — SIGNIFICANT CHANGE UP
TRIGL SERPL-MCNC: 87 MG/DL — SIGNIFICANT CHANGE UP
WBC # BLD: 6.83 K/UL — SIGNIFICANT CHANGE UP (ref 3.8–10.5)
WBC # BLD: 6.83 K/UL — SIGNIFICANT CHANGE UP (ref 3.8–10.5)
WBC # FLD AUTO: 6.83 K/UL — SIGNIFICANT CHANGE UP (ref 3.8–10.5)
WBC # FLD AUTO: 6.83 K/UL — SIGNIFICANT CHANGE UP (ref 3.8–10.5)

## 2023-10-28 PROCEDURE — 99232 SBSQ HOSP IP/OBS MODERATE 35: CPT

## 2023-10-28 RX ORDER — POTASSIUM CHLORIDE 20 MEQ
10 PACKET (EA) ORAL
Refills: 0 | Status: COMPLETED | OUTPATIENT
Start: 2023-10-28 | End: 2023-10-28

## 2023-10-28 RX ORDER — FLUCONAZOLE 150 MG/1
200 TABLET ORAL ONCE
Refills: 0 | Status: COMPLETED | OUTPATIENT
Start: 2023-10-28 | End: 2023-10-28

## 2023-10-28 RX ADMIN — Medication 100 MILLIEQUIVALENT(S): at 11:29

## 2023-10-28 RX ADMIN — Medication 100 MILLIEQUIVALENT(S): at 15:17

## 2023-10-28 RX ADMIN — Medication 100 MILLIEQUIVALENT(S): at 10:22

## 2023-10-28 RX ADMIN — FLUCONAZOLE 100 MILLIGRAM(S): 150 TABLET ORAL at 16:13

## 2023-10-28 RX ADMIN — PANTOPRAZOLE SODIUM 40 MILLIGRAM(S): 20 TABLET, DELAYED RELEASE ORAL at 13:05

## 2023-10-28 RX ADMIN — Medication 100 MILLIEQUIVALENT(S): at 13:28

## 2023-10-28 RX ADMIN — Medication 100 MILLIEQUIVALENT(S): at 16:14

## 2023-10-28 RX ADMIN — CEFTRIAXONE 100 MILLIGRAM(S): 500 INJECTION, POWDER, FOR SOLUTION INTRAMUSCULAR; INTRAVENOUS at 17:09

## 2023-10-28 RX ADMIN — ENOXAPARIN SODIUM 40 MILLIGRAM(S): 100 INJECTION SUBCUTANEOUS at 17:10

## 2023-10-28 RX ADMIN — Medication 125 MILLIGRAM(S): at 16:10

## 2023-10-28 NOTE — PROGRESS NOTE ADULT - SUBJECTIVE AND OBJECTIVE BOX
PROGRESS NOTE:     Patient is a 89y old  Female who presents with a chief complaint of SEPSIS     (27 Oct 2023 15:12)        SUBJECTIVE & OBJECTIVE:   Pt seen and examined at bedside in AM    no overnight events.     ROS: unable to examine due to [ ] Encephalopathy  [ ] Advanced Dementia  [ ] Expressive Aphasia  [ x] Non-verbal patient due to tongue swelling >>improved     REVIEW OF SYSTEMS: remaining ROS negative     PHYSICAL EXAM:  Vital Signs Last 24 Hrs  T(C): 36.4 (28 Oct 2023 17:00), Max: 37.2 (27 Oct 2023 22:00)  T(F): 97.6 (28 Oct 2023 17:00), Max: 99 (27 Oct 2023 22:00)  HR: 76 (28 Oct 2023 17:00) (76 - 90)  BP: 135/71 (28 Oct 2023 17:00) (112/61 - 135/71)  BP(mean): --  RR: 18 (28 Oct 2023 17:00) (17 - 18)  SpO2: 100% (28 Oct 2023 17:00) (96% - 100%)    Parameters below as of 28 Oct 2023 17:00  Patient On (Oxygen Delivery Method): nasal cannula          GENERAL: NAD,  no increased WOB  HEAD:  Atraumatic, Normocephalic  EYES: EOMI, PERRLA, conjunctiva and sclera clear  ENMT: Moist mucous membranes, tongue is swollen >>improved   NECK: Supple, No JVD, NO STRIDOR   NERVOUS SYSTEM:  awake but sleepy, weak appearing, moving extremities   CHEST/LUNG: Clear to auscultation bilaterally; No rales, rhonchi, wheezing, or rubs  HEART: Regular rate and rhythm; No murmurs, rubs, or gallops  ABDOMEN: Soft, Nontender, Nondistended; Bowel sounds present  EXTREMITIES:  2+ Peripheral Pulses b/l, No clubbing, cyanosis, calf tenderness, calf tenderness  or edema b/l    MEDICATIONS  (STANDING):  cefTRIAXone   IVPB 1000 milliGRAM(s) IV Intermittent every 24 hours  dextrose 5%. 1000 milliLiter(s) (35 mL/Hr) IV Continuous <Continuous>  enoxaparin Injectable 40 milliGRAM(s) SubCutaneous every 24 hours  iron sucrose IVPB 200 milliGRAM(s) IV Intermittent once  ketorolac   Injectable 15 milliGRAM(s) IV Push once  pantoprazole  Injectable 40 milliGRAM(s) IV Push daily    MEDICATIONS  (PRN):  acetaminophen  Suppository .. 650 milliGRAM(s) Rectal every 6 hours PRN Temp greater or equal to 38.5C (101.3F), Mild Pain (1 - 3)  melatonin 3 milliGRAM(s) Oral at bedtime PRN Insomnia  metoprolol tartrate Injectable 5 milliGRAM(s) IV Push every 6 hours PRN if SBP >160 or HR >100      LABS:                        8.2    6.83  )-----------( 406      ( 28 Oct 2023 08:15 )             26.8   10-28    138  |  108  |  9   ----------------------------<  142<H>  3.3<L>   |  21<L>  |  0.29<L>    Ca    8.6      28 Oct 2023 08:15  Phos  3.0     10-27  Mg     2.1     10-27    TPro  6.8  /  Alb  2.1<L>  /  TBili  0.4  /  DBili  x   /  AST  25  /  ALT  18  /  AlkPhos  110  10-27         PTT - ( 26 Oct 2023 11:10 )  PTT:28.0 sec  Urinalysis Basic - ( 27 Oct 2023 07:03 )    Color: x / Appearance: x / SG: x / pH: x  Gluc: 84 mg/dL / Ketone: x  / Bili: x / Urobili: x   Blood: x / Protein: x / Nitrite: x   Leuk Esterase: x / RBC: x / WBC x   Sq Epi: x / Non Sq Epi: x / Bacteria: x      Magnesium: 2.1 mg/dL (10-27 @ 07:03)    CAPILLARY BLOOD GLUCOSE                RECENT CULTURES:  Urine culture:  10-26 @ 11:44 --   No growth at 24 hours  Urine culture:  10-26 @ 11:10 --   No growth at 24 hours    .Blood Blood-Peripheral  10-26 @ 11:44   No growth at 24 hours  --  --      .Blood Blood-Peripheral  10-26 @ 11:10   No growth at 24 hours  --  --            RADIOLOGY & ADDITIONAL TESTS:          Radiology reports read and imaging reviewed  :  [ x] YES  [ ] NO  (I am not a radiologist and therefore rely on Radiologist reports to facilitate with diagnosis and treatment plans)    Consultant(s) Notes Reviewed:  [x ] YES  [ ] NO    Care Discussed with Consultants/Other Providers [x ] YES  [ ] NO  Care plan and all findings were discussed in detail with patient, daughter Tanyalin and granddaughter.  All questions and concerns addressed

## 2023-10-28 NOTE — PROGRESS NOTE ADULT - ASSESSMENT
89 years old female with h/o HTN, HLD, GERD, decubitus ulcer was brought in to ED with concern for AMS. Daughter noted patient appear confused today AM with some blood around the tongue. Patient reported dysuria for last few days. No nausea or vomiting. Patient is bedbound since around 08/2023. Patient also appear to have some dysphagia and is currently only able to eat puree diet.   Febrile to 101.7, tachycardic upon arrival. WBC 14.29, plt 489, Hb 7.4, K 4.4, Cr 0.48, hsTnT 17.1, lactate 1.5. UA positive for UTI. RVP negative. CT head with no acute pathology. Mild to mod chronic microvascular changes. CXR with no focal consolidation    Assessment and Plan:   Acute metabolic encephalopathy POA  Sepsis POA (tachycardia, 101.7F , leukocytosis), possibly UTI ?   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  transfuse 1u PRBC   venofer x 1   solumedrol x 1 for tongue swelling , toradol x 1   continue with ceftriaxone   follow MRI head , Neuro consulted   PT wound     Preventative Measures   lovenox SQ-dvt ppx  fall, aspiration precautions   HOBE   palliative following, DNR with trial of intubation     will give folic acid once can tolerate PO intake      Non family member

## 2023-10-28 NOTE — CONSULT NOTE ADULT - ASSESSMENT
89F w/ HTN, HLD, GERD, decubitus ulcer was brought in to ED with concern for AMS, possible tongue bite.   In ED - Febrile to 101.7, tachycardic upon arrival. WBC 14.29, plt 489, Hb 7.4, K 4.4, Cr 0.48, hsTnT 17.1, lactate 1.5. UA positive for UTI. RVP negative.   CTH with extensive microvascular ischemic changes  EEG with generalized slowing   o/e is mildly confusd but moves all extremities equally without lateralizing deficit    Impression: Acute encephalopathy superimposed on likely vascular dementia, possibly due to UTI. Unclear if seizure  Chronic microcytic anemia  functional quadraplegia     Plan:  - as not yet back to baseline per daughter w/ tx of underlying infection can consider inpatient MRI brain if able to tolerate but doubt will change mgmt  - TSH, RPR  - EEG as above  - would not tx w/ AEDs at this time   - Tx of UTI per primary team   - PT/OT    Lakeisha Arguello DO  Vascular Neurology  Office [phone]

## 2023-10-28 NOTE — CONSULT NOTE ADULT - SUBJECTIVE AND OBJECTIVE BOX
Neurology Consult    Reason for Consult: Patient is a 89y old  Female who presents with a chief complaint of sepsis due to UTI,  tongue swelling (27 Oct 2023 19:00)      HPI:  Talked to daughter Katlyn ( [phone])  89 years old female with h/o HTN, HLD, GERD, decubitus ulcer was brought in to ED with concern for AMS. Daughter noted patient appear confused today AM with some blood around the tongue. Patient reported dysuria for last few days. No nausea or vomiting. Patient is bedbound since around 08/2023. Patient also appear to have some dysphagia and is currently only able to eat puree diet.   Febrile to 101.7, tachycardic upon arrival. WBC 14.29, plt 489, Hb 7.4, K 4.4, Cr 0.48, hsTnT 17.1, lactate 1.5. UA positive for UTI. RVP negative. CT head with no acute pathology. Mild to mod chronic microvascular changes. CXR with no focal consolidation    SH: no toxic habits (26 Oct 2023 16:27)       PAST MEDICAL & SURGICAL HISTORY:  Hypertension      High cholesterol          Allergies: Allergies    No Known Allergies    Intolerances        Social History: Denies toxic habits including tobacco, ETOH or illicit drugs.    Family History: FAMILY HISTORY:  . No family history of strokes    Medications: MEDICATIONS  (STANDING):  cefTRIAXone   IVPB 1000 milliGRAM(s) IV Intermittent every 24 hours  dextrose 5%. 1000 milliLiter(s) (35 mL/Hr) IV Continuous <Continuous>  enoxaparin Injectable 40 milliGRAM(s) SubCutaneous every 24 hours  pantoprazole  Injectable 40 milliGRAM(s) IV Push daily    MEDICATIONS  (PRN):  acetaminophen  Suppository .. 650 milliGRAM(s) Rectal every 6 hours PRN Temp greater or equal to 38.5C (101.3F), Mild Pain (1 - 3)  melatonin 3 milliGRAM(s) Oral at bedtime PRN Insomnia  metoprolol tartrate Injectable 5 milliGRAM(s) IV Push every 6 hours PRN if SBP >160 or HR >100      Review of Systems:  CONSTITUTIONAL:  No weight loss, fever, chills, weakness or fatigue.  HEENT:  Eyes:  No visual loss, blurred vision, double vision or yellow sclera. Ears, Nose, Throat:  No hearing loss, sneezing, congestion, runny nose or sore throat.  SKIN:  No rash or itching.  CARDIOVASCULAR:  No chest pain, chest pressure or chest discomfort. No palpitations or edema.  RESPIRATORY:  No shortness of breath, cough or sputum.  GASTROINTESTINAL:  No anorexia, nausea, vomiting or diarrhea. No abdominal pain or blood.  GENITOURINARY:  No burning on urination or incontinence   NEUROLOGICAL:  No headache, dizziness, syncope, paralysis, ataxia, numbness or tingling in the extremities. No change in bowel or bladder control. no limb weakness. no vision changes.   MUSCULOSKELETAL:  No muscle, back pain, joint pain or stiffness.  HEMATOLOGIC:  No anemia, bleeding or bruising.  LYMPHATICS:  No enlarged nodes. No history of splenectomy.  PSYCHIATRIC:  No history of depression or anxiety.  ENDOCRINOLOGIC:  No reports of sweating, cold or heat intolerance. No polyuria or polydipsia.      Vitals:  Vital Signs Last 24 Hrs  T(C): 36.4 (28 Oct 2023 17:00), Max: 37.2 (27 Oct 2023 22:00)  T(F): 97.6 (28 Oct 2023 17:00), Max: 99 (27 Oct 2023 22:00)  HR: 76 (28 Oct 2023 17:00) (76 - 90)  BP: 135/71 (28 Oct 2023 17:00) (112/61 - 135/71)  BP(mean): --  RR: 18 (28 Oct 2023 17:00) (17 - 18)  SpO2: 100% (28 Oct 2023 17:00) (96% - 100%)    Parameters below as of 28 Oct 2023 17:00  Patient On (Oxygen Delivery Method): nasal cannula        General Exam:   General Appearance: Appropriately dressed and in no acute distress       Head: Normocephalic, atraumatic and no dysmorphic features  Ear, Nose, and Throat: Moist mucous membranes  CVS: S1S2+  Resp: No SOB, no wheeze or rhonchi  GI: soft NT/ND  Extremities: No edema or cyanosis  Skin: No bruises or rashes     Neurological Exam:  Mental Status: Awake, alert and oriented x 3.  Able to follow simple and complex verbal commands. Able to name and repeat. fluent speech. No obvious aphasia or dysarthria noted.   Cranial Nerves: PERRL, EOMI, VFFC, sensation V1-V3 intact,  no obvious facial asymmetry, equal elevation of palate, scm/trap 5/5, tongue is midline on protrusion. no obvious papilledema on fundoscopic exam. hearing is grossly intact.   Motor: Normal bulk, tone and strength throughout. Fine finger movements were intact and symmetric. no tremors or drift noted.    Sensation: Intact to light touch and pinprick throughout. no right/left confusion. no extinction to tactile on DSS. Romberg was negative.   Reflexes: 1+ throughout at biceps, brachioradialis, triceps, patellars and ankles bilaterally and equal. No clonus. R toe and L toe were both downgoing.  Coordination: No dysmetria on FNF or HKS  Gait: Narrow based and steady. Able to tandem. no limitations in gait.     Data/Labs/Imaging which I personally reviewed.     Labs:     CBC Full  -  ( 28 Oct 2023 08:15 )  WBC Count : 6.83 K/uL  RBC Count : 3.41 M/uL  Hemoglobin : 8.2 g/dL  Hematocrit : 26.8 %  Platelet Count - Automated : 406 K/uL  Mean Cell Volume : 78.6 fl  Mean Cell Hemoglobin : 24.0 pg  Mean Cell Hemoglobin Concentration : 30.6 g/dL  Auto Neutrophil # : x  Auto Lymphocyte # : x  Auto Monocyte # : x  Auto Eosinophil # : x  Auto Basophil # : x  Auto Neutrophil % : x  Auto Lymphocyte % : x  Auto Monocyte % : x  Auto Eosinophil % : x  Auto Basophil % : x    10-28    138  |  108  |  9   ----------------------------<  142<H>  3.3<L>   |  21<L>  |  0.29<L>    Ca    8.6      28 Oct 2023 08:15  Phos  3.0     10-27  Mg     2.1     10-27    TPro  6.8  /  Alb  2.1<L>  /  TBili  0.4  /  DBili  x   /  AST  25  /  ALT  18  /  AlkPhos  110  10-27    LIVER FUNCTIONS - ( 27 Oct 2023 07:03 )  Alb: 2.1 g/dL / Pro: 6.8 gm/dL / ALK PHOS: 110 U/L / ALT: 18 U/L / AST: 25 U/L / GGT: x             Urinalysis Basic - ( 28 Oct 2023 08:15 )    Color: x / Appearance: x / SG: x / pH: x  Gluc: 142 mg/dL / Ketone: x  / Bili: x / Urobili: x   Blood: x / Protein: x / Nitrite: x   Leuk Esterase: x / RBC: x / WBC x   Sq Epi: x / Non Sq Epi: x / Bacteria: x      EEG SUMMARY/CLASSIFICATION    Abnormal EEG    - Mild to Moderate generalized slowing.    _____________________________________________________________  EEG IMPRESSION/CLINICAL CORRELATE    Abnormal EEG study.  Mild to Moderate nonspecific diffuse or multifocal cerebral dysfunction.   No epileptiform pattern or seizure seen.    Ray Cross MD  EEG/Epilepsy Attending       Neurology Consult    Reason for Consult: Patient is a 89y old  Female who presents with a chief complaint of sepsis due to UTI,  tongue swelling (27 Oct 2023 19:00)      HPI:  Talked to daughter Katlyn ( [phone])  89 years old female with h/o HTN, HLD, GERD, decubitus ulcer was brought in to ED with concern for AMS. Daughter noted patient appear confused today AM with some blood around the tongue. Patient reported dysuria for last few days. No nausea or vomiting. Patient is bedbound since around 08/2023. Patient also appear to have some dysphagia and is currently only able to eat puree diet.   Febrile to 101.7, tachycardic upon arrival. WBC 14.29, plt 489, Hb 7.4, K 4.4, Cr 0.48, hsTnT 17.1, lactate 1.5. UA positive for UTI. RVP negative. CT head with no acute pathology. Mild to mod chronic microvascular changes. CXR with no focal consolidation    SH: no toxic habits (26 Oct 2023 16:27)       PAST MEDICAL & SURGICAL HISTORY:  Hypertension      High cholesterol          Allergies: Allergies    No Known Allergies    Intolerances        Social History: Denies toxic habits including tobacco, ETOH or illicit drugs.    Family History: FAMILY HISTORY:  . No family history of strokes    Medications: MEDICATIONS  (STANDING):  cefTRIAXone   IVPB 1000 milliGRAM(s) IV Intermittent every 24 hours  dextrose 5%. 1000 milliLiter(s) (35 mL/Hr) IV Continuous <Continuous>  enoxaparin Injectable 40 milliGRAM(s) SubCutaneous every 24 hours  pantoprazole  Injectable 40 milliGRAM(s) IV Push daily    MEDICATIONS  (PRN):  acetaminophen  Suppository .. 650 milliGRAM(s) Rectal every 6 hours PRN Temp greater or equal to 38.5C (101.3F), Mild Pain (1 - 3)  melatonin 3 milliGRAM(s) Oral at bedtime PRN Insomnia  metoprolol tartrate Injectable 5 milliGRAM(s) IV Push every 6 hours PRN if SBP >160 or HR >100      Review of Systems:  CONSTITUTIONAL:  No weight loss, fever, chills, weakness or fatigue.  HEENT:  Eyes:  No visual loss, blurred vision, double vision or yellow sclera. Ears, Nose, Throat:  No hearing loss, sneezing, congestion, runny nose or sore throat.  SKIN:  No rash or itching.  CARDIOVASCULAR:  No chest pain, chest pressure or chest discomfort. No palpitations or edema.  RESPIRATORY:  No shortness of breath, cough or sputum.  GASTROINTESTINAL:  No anorexia, nausea, vomiting or diarrhea. No abdominal pain or blood.  GENITOURINARY:  No burning on urination or incontinence   NEUROLOGICAL:  + mental status changes  MUSCULOSKELETAL:  No muscle, back pain, joint pain or stiffness.  HEMATOLOGIC:  No anemia, bleeding or bruising.  LYMPHATICS:  No enlarged nodes. No history of splenectomy.  PSYCHIATRIC:  No history of depression or anxiety.  ENDOCRINOLOGIC:  No reports of sweating, cold or heat intolerance. No polyuria or polydipsia.      Vitals:  Vital Signs Last 24 Hrs  T(C): 36.4 (28 Oct 2023 17:00), Max: 37.2 (27 Oct 2023 22:00)  T(F): 97.6 (28 Oct 2023 17:00), Max: 99 (27 Oct 2023 22:00)  HR: 76 (28 Oct 2023 17:00) (76 - 90)  BP: 135/71 (28 Oct 2023 17:00) (112/61 - 135/71)  BP(mean): --  RR: 18 (28 Oct 2023 17:00) (17 - 18)  SpO2: 100% (28 Oct 2023 17:00) (96% - 100%)    Parameters below as of 28 Oct 2023 17:00  Patient On (Oxygen Delivery Method): nasal cannula        General Exam:   General Appearance: Appropriately dressed and in no acute distress       Head: Normocephalic, atraumatic and no dysmorphic features  Ear, Nose, and Throat: Moist mucous membranes  CVS: S1S2+  Resp: No SOB, no wheeze or rhonchi  GI: soft NT/ND  Extremities: No edema or cyanosis  Skin: No bruises or rashes     Neurological Exam:  Mental Status: Awake, alert and oriented x 1-2 - knows name. Says she is in [place] with [place] daughter. Month is November, Follows simple commands.  Cranial Nerves: PERRL, EOMI, VFFC, sensation V1-V3 intact,  no obvious facial asymmetry, equal elevation of palate, scm/trap 5/5, tongue is midline on protrusion  Motor: at least 4/5 throughout with prompting  Sensation: Intact to light touch and pinprick throughout.   Reflexes: 0 throughout at biceps, brachioradialis, triceps, patellars and ankles bilaterally and equal. No clonus. R toe and L toe were both downgoing.  Coordination: unable  Gait: deferred    Data/Labs/Imaging which I personally reviewed.     Labs:     CBC Full  -  ( 28 Oct 2023 08:15 )  WBC Count : 6.83 K/uL  RBC Count : 3.41 M/uL  Hemoglobin : 8.2 g/dL  Hematocrit : 26.8 %  Platelet Count - Automated : 406 K/uL  Mean Cell Volume : 78.6 fl  Mean Cell Hemoglobin : 24.0 pg  Mean Cell Hemoglobin Concentration : 30.6 g/dL  Auto Neutrophil # : x  Auto Lymphocyte # : x  Auto Monocyte # : x  Auto Eosinophil # : x  Auto Basophil # : x  Auto Neutrophil % : x  Auto Lymphocyte % : x  Auto Monocyte % : x  Auto Eosinophil % : x  Auto Basophil % : x    10-28    138  |  108  |  9   ----------------------------<  142<H>  3.3<L>   |  21<L>  |  0.29<L>    Ca    8.6      28 Oct 2023 08:15  Phos  3.0     10-27  Mg     2.1     10-27    TPro  6.8  /  Alb  2.1<L>  /  TBili  0.4  /  DBili  x   /  AST  25  /  ALT  18  /  AlkPhos  110  10-27    LIVER FUNCTIONS - ( 27 Oct 2023 07:03 )  Alb: 2.1 g/dL / Pro: 6.8 gm/dL / ALK PHOS: 110 U/L / ALT: 18 U/L / AST: 25 U/L / GGT: x             Urinalysis Basic - ( 28 Oct 2023 08:15 )    Color: x / Appearance: x / SG: x / pH: x  Gluc: 142 mg/dL / Ketone: x  / Bili: x / Urobili: x   Blood: x / Protein: x / Nitrite: x   Leuk Esterase: x / RBC: x / WBC x   Sq Epi: x / Non Sq Epi: x / Bacteria: x      EEG SUMMARY/CLASSIFICATION    Abnormal EEG    - Mild to Moderate generalized slowing.    _____________________________________________________________  EEG IMPRESSION/CLINICAL CORRELATE    Abnormal EEG study.  Mild to Moderate nonspecific diffuse or multifocal cerebral dysfunction.   No epileptiform pattern or seizure seen.    Ray Cross MD  EEG/Epilepsy Attending    < from: CT Head No Cont (10.26.23 @ 12:21) >  IMPRESSION:  Mild to moderate chronic microvascular changes without   evidence of an acute transcortical infarction or hemorrhage.    < end of copied text >

## 2023-10-29 LAB
-  AMIKACIN: SIGNIFICANT CHANGE UP
-  AMIKACIN: SIGNIFICANT CHANGE UP
-  AMOXICILLIN/CLAVULANIC ACID: SIGNIFICANT CHANGE UP
-  AMOXICILLIN/CLAVULANIC ACID: SIGNIFICANT CHANGE UP
-  AMPICILLIN/SULBACTAM: SIGNIFICANT CHANGE UP
-  AMPICILLIN/SULBACTAM: SIGNIFICANT CHANGE UP
-  AMPICILLIN: SIGNIFICANT CHANGE UP
-  AMPICILLIN: SIGNIFICANT CHANGE UP
-  AZTREONAM: SIGNIFICANT CHANGE UP
-  AZTREONAM: SIGNIFICANT CHANGE UP
-  CEFAZOLIN: SIGNIFICANT CHANGE UP
-  CEFAZOLIN: SIGNIFICANT CHANGE UP
-  CEFEPIME: SIGNIFICANT CHANGE UP
-  CEFEPIME: SIGNIFICANT CHANGE UP
-  CEFOXITIN: SIGNIFICANT CHANGE UP
-  CEFOXITIN: SIGNIFICANT CHANGE UP
-  CEFTRIAXONE: SIGNIFICANT CHANGE UP
-  CEFTRIAXONE: SIGNIFICANT CHANGE UP
-  CEFUROXIME: SIGNIFICANT CHANGE UP
-  CEFUROXIME: SIGNIFICANT CHANGE UP
-  CIPROFLOXACIN: SIGNIFICANT CHANGE UP
-  CIPROFLOXACIN: SIGNIFICANT CHANGE UP
-  ERTAPENEM: SIGNIFICANT CHANGE UP
-  ERTAPENEM: SIGNIFICANT CHANGE UP
-  GENTAMICIN: SIGNIFICANT CHANGE UP
-  GENTAMICIN: SIGNIFICANT CHANGE UP
-  IMIPENEM: SIGNIFICANT CHANGE UP
-  IMIPENEM: SIGNIFICANT CHANGE UP
-  LEVOFLOXACIN: SIGNIFICANT CHANGE UP
-  LEVOFLOXACIN: SIGNIFICANT CHANGE UP
-  MEROPENEM: SIGNIFICANT CHANGE UP
-  MEROPENEM: SIGNIFICANT CHANGE UP
-  NITROFURANTOIN: SIGNIFICANT CHANGE UP
-  NITROFURANTOIN: SIGNIFICANT CHANGE UP
-  PIPERACILLIN/TAZOBACTAM: SIGNIFICANT CHANGE UP
-  PIPERACILLIN/TAZOBACTAM: SIGNIFICANT CHANGE UP
-  TOBRAMYCIN: SIGNIFICANT CHANGE UP
-  TOBRAMYCIN: SIGNIFICANT CHANGE UP
-  TRIMETHOPRIM/SULFAMETHOXAZOLE: SIGNIFICANT CHANGE UP
-  TRIMETHOPRIM/SULFAMETHOXAZOLE: SIGNIFICANT CHANGE UP
ANION GAP SERPL CALC-SCNC: 8 MMOL/L — SIGNIFICANT CHANGE UP (ref 5–17)
ANION GAP SERPL CALC-SCNC: 8 MMOL/L — SIGNIFICANT CHANGE UP (ref 5–17)
BUN SERPL-MCNC: 11 MG/DL — SIGNIFICANT CHANGE UP (ref 7–23)
BUN SERPL-MCNC: 11 MG/DL — SIGNIFICANT CHANGE UP (ref 7–23)
CALCIUM SERPL-MCNC: 8.9 MG/DL — SIGNIFICANT CHANGE UP (ref 8.5–10.1)
CALCIUM SERPL-MCNC: 8.9 MG/DL — SIGNIFICANT CHANGE UP (ref 8.5–10.1)
CHLORIDE SERPL-SCNC: 107 MMOL/L — SIGNIFICANT CHANGE UP (ref 96–108)
CHLORIDE SERPL-SCNC: 107 MMOL/L — SIGNIFICANT CHANGE UP (ref 96–108)
CO2 SERPL-SCNC: 24 MMOL/L — SIGNIFICANT CHANGE UP (ref 22–31)
CO2 SERPL-SCNC: 24 MMOL/L — SIGNIFICANT CHANGE UP (ref 22–31)
CREAT SERPL-MCNC: 0.36 MG/DL — LOW (ref 0.5–1.3)
CREAT SERPL-MCNC: 0.36 MG/DL — LOW (ref 0.5–1.3)
CULTURE RESULTS: ABNORMAL
CULTURE RESULTS: ABNORMAL
EGFR: 93 ML/MIN/1.73M2 — SIGNIFICANT CHANGE UP
EGFR: 93 ML/MIN/1.73M2 — SIGNIFICANT CHANGE UP
GLUCOSE SERPL-MCNC: 169 MG/DL — HIGH (ref 70–99)
GLUCOSE SERPL-MCNC: 169 MG/DL — HIGH (ref 70–99)
HCT VFR BLD CALC: 28.9 % — LOW (ref 34.5–45)
HCT VFR BLD CALC: 28.9 % — LOW (ref 34.5–45)
HGB BLD-MCNC: 8.5 G/DL — LOW (ref 11.5–15.5)
HGB BLD-MCNC: 8.5 G/DL — LOW (ref 11.5–15.5)
MAGNESIUM SERPL-MCNC: 2.3 MG/DL — SIGNIFICANT CHANGE UP (ref 1.6–2.6)
MAGNESIUM SERPL-MCNC: 2.3 MG/DL — SIGNIFICANT CHANGE UP (ref 1.6–2.6)
MCHC RBC-ENTMCNC: 23.4 PG — LOW (ref 27–34)
MCHC RBC-ENTMCNC: 23.4 PG — LOW (ref 27–34)
MCHC RBC-ENTMCNC: 29.4 G/DL — LOW (ref 32–36)
MCHC RBC-ENTMCNC: 29.4 G/DL — LOW (ref 32–36)
MCV RBC AUTO: 79.4 FL — LOW (ref 80–100)
MCV RBC AUTO: 79.4 FL — LOW (ref 80–100)
METHOD TYPE: SIGNIFICANT CHANGE UP
METHOD TYPE: SIGNIFICANT CHANGE UP
NRBC # BLD: 2 /100 WBCS — HIGH (ref 0–0)
NRBC # BLD: 2 /100 WBCS — HIGH (ref 0–0)
ORGANISM # SPEC MICROSCOPIC CNT: ABNORMAL
ORGANISM # SPEC MICROSCOPIC CNT: ABNORMAL
ORGANISM # SPEC MICROSCOPIC CNT: SIGNIFICANT CHANGE UP
ORGANISM # SPEC MICROSCOPIC CNT: SIGNIFICANT CHANGE UP
PHOSPHATE SERPL-MCNC: 2.7 MG/DL — SIGNIFICANT CHANGE UP (ref 2.5–4.5)
PHOSPHATE SERPL-MCNC: 2.7 MG/DL — SIGNIFICANT CHANGE UP (ref 2.5–4.5)
PLATELET # BLD AUTO: 438 K/UL — HIGH (ref 150–400)
PLATELET # BLD AUTO: 438 K/UL — HIGH (ref 150–400)
POTASSIUM SERPL-MCNC: 4 MMOL/L — SIGNIFICANT CHANGE UP (ref 3.5–5.3)
POTASSIUM SERPL-MCNC: 4 MMOL/L — SIGNIFICANT CHANGE UP (ref 3.5–5.3)
POTASSIUM SERPL-SCNC: 4 MMOL/L — SIGNIFICANT CHANGE UP (ref 3.5–5.3)
POTASSIUM SERPL-SCNC: 4 MMOL/L — SIGNIFICANT CHANGE UP (ref 3.5–5.3)
RBC # BLD: 3.64 M/UL — LOW (ref 3.8–5.2)
RBC # BLD: 3.64 M/UL — LOW (ref 3.8–5.2)
RBC # FLD: 19.5 % — HIGH (ref 10.3–14.5)
RBC # FLD: 19.5 % — HIGH (ref 10.3–14.5)
SODIUM SERPL-SCNC: 139 MMOL/L — SIGNIFICANT CHANGE UP (ref 135–145)
SODIUM SERPL-SCNC: 139 MMOL/L — SIGNIFICANT CHANGE UP (ref 135–145)
SPECIMEN SOURCE: SIGNIFICANT CHANGE UP
SPECIMEN SOURCE: SIGNIFICANT CHANGE UP
WBC # BLD: 4.9 K/UL — SIGNIFICANT CHANGE UP (ref 3.8–10.5)
WBC # BLD: 4.9 K/UL — SIGNIFICANT CHANGE UP (ref 3.8–10.5)
WBC # FLD AUTO: 4.9 K/UL — SIGNIFICANT CHANGE UP (ref 3.8–10.5)
WBC # FLD AUTO: 4.9 K/UL — SIGNIFICANT CHANGE UP (ref 3.8–10.5)

## 2023-10-29 PROCEDURE — 99232 SBSQ HOSP IP/OBS MODERATE 35: CPT

## 2023-10-29 RX ORDER — FOLIC ACID 0.8 MG
1 TABLET ORAL DAILY
Refills: 0 | Status: DISCONTINUED | OUTPATIENT
Start: 2023-10-29 | End: 2023-10-31

## 2023-10-29 RX ADMIN — PANTOPRAZOLE SODIUM 40 MILLIGRAM(S): 20 TABLET, DELAYED RELEASE ORAL at 12:30

## 2023-10-29 RX ADMIN — ENOXAPARIN SODIUM 40 MILLIGRAM(S): 100 INJECTION SUBCUTANEOUS at 17:54

## 2023-10-29 RX ADMIN — Medication 125 MILLIGRAM(S): at 15:35

## 2023-10-29 RX ADMIN — SODIUM CHLORIDE 35 MILLILITER(S): 9 INJECTION, SOLUTION INTRAVENOUS at 08:24

## 2023-10-29 NOTE — PROGRESS NOTE ADULT - ASSESSMENT
89 years old female with h/o HTN, HLD, GERD, decubitus ulcer was brought in to ED with concern for AMS. Daughter noted patient appear confused today AM with some blood around the tongue. Patient reported dysuria for last few days. No nausea or vomiting. Patient is bedbound since around 08/2023. Patient also appear to have some dysphagia and is currently only able to eat puree diet.   Febrile to 101.7, tachycardic upon arrival. WBC 14.29, plt 489, Hb 7.4, K 4.4, Cr 0.48, hsTnT 17.1, lactate 1.5. UA positive for UTI. RVP negative. CT head with no acute pathology. Mild to mod chronic microvascular changes. CXR with no focal consolidation    Assessment and Plan:   Acute metabolic encephalopathy POA  Sepsis POA (tachycardia, 101.7F , leukocytosis), E.coli UTI s/p abx   tongue bite and swelling  , bleeding resolved , no stridor or airway compromise at this time , tongue now back to baseline   ruling out CVA vs seizure   Chronic Microcytic anemia , per daughter is chronic and known to her PMD, daughter denies seeing brbpr or melena ; anemia labs with SHELLI and low folic acid , known to daughter states is chronic   functional quadriplegia 2/2 diffuse severe arthritis , has been bedbound for a few months now (daughter denies dementia, but patient unable to care or feed herself)   hypokalemia , repleted   sacral decub, known to daughter     blood Cx --NGTD   RVP neg   EKG: sinus tachy, 1st degree AVB   CTH: no acute findings   CXR clear   EEG: No epileptiform pattern or seizure seen.  transfuse 1u PRBC   venofer x 1   solumedrol x 3 for tongue swelling >>back to normal   completed ceftriaxone course   follow MRI head , Neuro consulted   PT wound appreciated recs   10/29 given water and apple sauce at bedside >>swallowed normally, started on puree diet     Preventative Measures   lovenox SQ-dvt ppx  fall, aspiration precautions   HOBE   palliative following, DNR with trial of intubation

## 2023-10-29 NOTE — PHYSICAL THERAPY INITIAL EVALUATION ADULT - ADDITIONAL COMMENTS
Per granddaughter Jerri at bedside, patient lives with family, has been bedbound since August 2023. Patient in process of getting hospital bed. Patient's family was putting triple cream on wound before patient was admitted to hospital.

## 2023-10-29 NOTE — PROGRESS NOTE ADULT - TIME BILLING
min spent reviewing patient's chart,  examining patient, discussing plan with patient and family and staff, reviewing consultant recommendations/communicating with consultants, writing progress note and placing orders.

## 2023-10-29 NOTE — PROGRESS NOTE ADULT - SUBJECTIVE AND OBJECTIVE BOX
PROGRESS NOTE:     Patient is a 89y old  Female who presents with a chief complaint of SEPSIS     (27 Oct 2023 15:12)        SUBJECTIVE & OBJECTIVE:   Pt seen and examined at bedside in AM    no overnight events.   tongue is back to normal size  she is able to speak normally asking for tea and crackers   water and apple sauce given at bedside and patient swallowed normally without cough     REVIEW OF SYSTEMS: remaining ROS negative     PHYSICAL EXAM:  Vital Signs Last 24 Hrs  T(C): 37.1 (29 Oct 2023 17:17), Max: 37.1 (29 Oct 2023 04:47)  T(F): 98.7 (29 Oct 2023 17:17), Max: 98.8 (29 Oct 2023 04:47)  HR: 76 (29 Oct 2023 17:17) (70 - 77)  BP: 133/74 (29 Oct 2023 17:17) (121/60 - 133/74)  BP(mean): --  RR: 18 (29 Oct 2023 17:17) (18 - 18)  SpO2: 94% (29 Oct 2023 17:17) (94% - 100%)    Parameters below as of 29 Oct 2023 17:17  Patient On (Oxygen Delivery Method): room air          GENERAL: NAD,  no increased WOB  HEAD:  Atraumatic, Normocephalic  EYES: EOMI, PERRLA, conjunctiva and sclera clear  ENMT: Moist mucous membranes, tongue is swollen >>improved   NECK: Supple, No JVD, NO STRIDOR   NERVOUS SYSTEM: awake and alert, moving extremities   CHEST/LUNG: Clear to auscultation bilaterally; No rales, rhonchi, wheezing, or rubs  HEART: Regular rate and rhythm; No murmurs, rubs, or gallops  ABDOMEN: Soft, Nontender, Nondistended; Bowel sounds present  EXTREMITIES:  2+ Peripheral Pulses b/l, No clubbing, cyanosis, calf tenderness, calf tenderness  or edema b/l    MEDICATIONS  (STANDING):  cefTRIAXone   IVPB 1000 milliGRAM(s) IV Intermittent every 24 hours  dextrose 5%. 1000 milliLiter(s) (35 mL/Hr) IV Continuous <Continuous>  enoxaparin Injectable 40 milliGRAM(s) SubCutaneous every 24 hours  iron sucrose IVPB 200 milliGRAM(s) IV Intermittent once  ketorolac   Injectable 15 milliGRAM(s) IV Push once  pantoprazole  Injectable 40 milliGRAM(s) IV Push daily    MEDICATIONS  (PRN):  acetaminophen  Suppository .. 650 milliGRAM(s) Rectal every 6 hours PRN Temp greater or equal to 38.5C (101.3F), Mild Pain (1 - 3)  melatonin 3 milliGRAM(s) Oral at bedtime PRN Insomnia  metoprolol tartrate Injectable 5 milliGRAM(s) IV Push every 6 hours PRN if SBP >160 or HR >100      LABS:                                   8.5    4.90  )-----------( 438      ( 29 Oct 2023 05:20 )             28.9   10-29    139  |  107  |  11  ----------------------------<  169<H>  4.0   |  24  |  0.36<L>    Ca    8.9      29 Oct 2023 05:20  Phos  2.7     10-29  Mg     2.3     10-29         PTT - ( 26 Oct 2023 11:10 )  PTT:28.0 sec  Urinalysis Basic - ( 27 Oct 2023 07:03 )    Color: x / Appearance: x / SG: x / pH: x  Gluc: 84 mg/dL / Ketone: x  / Bili: x / Urobili: x   Blood: x / Protein: x / Nitrite: x   Leuk Esterase: x / RBC: x / WBC x   Sq Epi: x / Non Sq Epi: x / Bacteria: x      Magnesium: 2.1 mg/dL (10-27 @ 07:03)    CAPILLARY BLOOD GLUCOSE                RECENT CULTURES:  Urine culture:  10-26 @ 11:44 --   No growth at 24 hours  Urine culture:  10-26 @ 11:10 --   No growth at 24 hours    .Blood Blood-Peripheral  10-26 @ 11:44   No growth at 24 hours  --  --      .Blood Blood-Peripheral  10-26 @ 11:10   No growth at 24 hours  --  --            RADIOLOGY & ADDITIONAL TESTS:          Radiology reports read and imaging reviewed  :  [ x] YES  [ ] NO  (I am not a radiologist and therefore rely on Radiologist reports to facilitate with diagnosis and treatment plans)    Consultant(s) Notes Reviewed:  [x ] YES  [ ] NO    Care Discussed with Consultants/Other Providers [x ] YES  [ ] NO  Care plan and all findings were discussed in detail with patient, daughter Euclin and granddaughter.  All questions and concerns addressed

## 2023-10-30 ENCOUNTER — TRANSCRIPTION ENCOUNTER (OUTPATIENT)
Age: 88
End: 2023-10-30

## 2023-10-30 LAB
TSH SERPL-MCNC: 0.63 UIU/ML — SIGNIFICANT CHANGE UP (ref 0.36–3.74)
TSH SERPL-MCNC: 0.63 UIU/ML — SIGNIFICANT CHANGE UP (ref 0.36–3.74)

## 2023-10-30 PROCEDURE — 99497 ADVNCD CARE PLAN 30 MIN: CPT

## 2023-10-30 PROCEDURE — 99239 HOSP IP/OBS DSCHRG MGMT >30: CPT

## 2023-10-30 RX ADMIN — PANTOPRAZOLE SODIUM 40 MILLIGRAM(S): 20 TABLET, DELAYED RELEASE ORAL at 11:34

## 2023-10-30 RX ADMIN — ENOXAPARIN SODIUM 40 MILLIGRAM(S): 100 INJECTION SUBCUTANEOUS at 18:58

## 2023-10-30 RX ADMIN — Medication 1 MILLIGRAM(S): at 11:34

## 2023-10-30 NOTE — DISCHARGE NOTE PROVIDER - NSDCFUADDAPPT_GEN_ALL_CORE_FT
It is important to see your primary physician as well as other necessary consultants within the next week to perform a comprehensive medical review.  Call their offices for an appointment as soon as you leave the hospital.  If you do not have a primary physician or cant reach him/her, contact the [facility] Physician Referral Service at [phone].  Your medical issues appear to be stable at this time, but if your symptoms recur or worsen, contact your physicians and/or return to the hospital if necessary.  If you encounter any issues or questions with your medication, call your physicians before stopping the medication.

## 2023-10-30 NOTE — DISCHARGE NOTE PROVIDER - DETAILS OF MALNUTRITION DIAGNOSIS/DIAGNOSES
This patient has been assessed with a concern for Malnutrition and was treated during this hospitalization for the following Nutrition diagnosis/diagnoses:     -  10/30/2023: Moderate protein-calorie malnutrition

## 2023-10-30 NOTE — DISCHARGE NOTE PROVIDER - CARE PROVIDER_API CALL
your primary care doctor,   Phone: (   )    -  Fax: (   )    -  Follow Up Time: 1 week    Lakeisha Arguello  Vascular Neurology  [address] [facility] [address] [place], [address]  [place] [address]  Phone: [phone]  Fax: [phone]  Follow Up Time: 1 week

## 2023-10-30 NOTE — PROGRESS NOTE ADULT - ASSESSMENT
89F w/ HTN, HLD, GERD, decubitus ulcer was brought in to ED with concern for AMS, possible tongue bite.   In ED - Febrile to 101.7, tachycardic upon arrival. WBC 14.29, plt 489, Hb 7.4, K 4.4, Cr 0.48, hsTnT 17.1, lactate 1.5. UA positive for UTI. RVP negative.   CTH with extensive microvascular ischemic changes  EEG with generalized slowing   o/e is mildly confusd but moves all extremities equally without lateralizing deficit    Impression: Acute encephalopathy superimposed on likely vascular dementia, possibly due to UTI. Unclear if seizure  Chronic microcytic anemia  functional quadraplegia     Plan:  - as not yet back to baseline per daughter w/ tx of underlying infection can consider inpatient MRI brain if able to tolerate but doubt will change mgmt  - TSH, RPR  - EEG as above  - would not tx w/ AEDs at this time   - Tx of UTI per primary team   - PT/OT    Lakeisha Arguello DO  Vascular Neurology  Office [phone]

## 2023-10-30 NOTE — PROGRESS NOTE ADULT - CONVERSATION DETAILS
Spoke with patient's daughter, Katlyn at bedside.  Patient was asking to eat and they initiated pleasure feeds.  Patient more alert and tongue not swollen.  Plan to continue current medical management.  Did discuss hospice and their philosophy of care as well as where hospice can take place.  Explained patient is likely becoming more appropriate for hospice and should she have poor po intake or swallowing issues, can opt to focus on her comfort and quality with the additional resources from hospice to keep her managed comfortably at home.  JARRET on file with DNR.

## 2023-10-30 NOTE — DISCHARGE NOTE PROVIDER - NSDCCPCAREPLAN_GEN_ALL_CORE_FT
PRINCIPAL DISCHARGE DIAGNOSIS  Diagnosis: Acute metabolic encephalopathy  Assessment and Plan of Treatment:       SECONDARY DISCHARGE DIAGNOSES  Diagnosis: Benign essential HTN  Assessment and Plan of Treatment:     Diagnosis: Decubitus ulcer  Assessment and Plan of Treatment:     Diagnosis: Hyperlipidemia, unspecified  Assessment and Plan of Treatment:     Diagnosis: Tongue swelling  Assessment and Plan of Treatment:     Diagnosis: Urinary tract infection  Assessment and Plan of Treatment:

## 2023-10-30 NOTE — DISCHARGE NOTE PROVIDER - NSDCMRMEDTOKEN_GEN_ALL_CORE_FT
amLODIPine 5 mg oral tablet: 1 tab(s) orally once a day  aspirin 81 mg oral delayed release tablet: 1 tab(s) orally once a day  hydroCHLOROthiazide 25 mg oral tablet: 1 tab(s) orally once a day  pantoprazole 40 mg oral delayed release tablet: 1 tab(s) orally once a day  rosuvastatin 5 mg oral tablet: 1 tab(s) orally once a day   amLODIPine 5 mg oral tablet: 1 tab(s) orally once a day  aspirin 81 mg oral delayed release tablet: 1 tab(s) orally once a day  folic acid 1 mg oral tablet: 1 tab(s) orally once a day  hydroCHLOROthiazide 25 mg oral tablet: 1 tab(s) orally once a day  Linzess 145 mcg oral capsule: 1 cap(s) orally once a day  pantoprazole 40 mg oral delayed release tablet: 1 tab(s) orally once a day  rosuvastatin 5 mg oral tablet: 1 tab(s) orally once a day

## 2023-10-30 NOTE — DISCHARGE NOTE PROVIDER - DISCHARGE DIET
Patient Education     Anal Itching (Pruritis Ani)  The anus is the opening where bowel movements come out of the body. The skin around the anus can easily become irritated and inflamed. You may feel burning, soreness, and intense itching. This can make you want to scratch the area.  Many factors lead to anal itching. Causes of anal itching include:  · Excess moisture on the skin around the anus. This can be from sweating, or from stool remaining on the outside after a bowel movement.  · Hemorrhoids  · Anal fissures and fistulas  · Infections, particularly fungal  · Abcesses  · Skin disorders  · Rectal polyps or cancer  · Foods such as caffeine, dairy products, chocolate, spicy foods, and acidic foods (tomatoes, citrus)  · Smoking  · Alcoholic beverages  The cause is not from poor cleaning, but from irritation. In fact, excessive cleaning with soap, and too roughly rubbing or wiping with tissue or a washcloth, can make it worse.  A doctor can ask you questions to help figure out the cause of your anal itch. You may be examined and lab tests may be done. Sometimes the doctor needs to perform a digital rectal exam, and look into the anus or the rest of the large intestine. Stool tests may also be done. These help the doctor decide how best to treat the problem.  Home care  Your healthcare provider may prescribe medicines. These can relieve pain and itching to help the affected skin heal. These medicines may include skin ointments, steroid creams, antibiotic creams, or antihistamines. You may need antibiotics if there is an infection. Follow the provider’s instructions for using these medicines.   The following are general care guidelines:  · Don't scratch! This irritates the anus and makes itching worse.  · Gently wash and dry the anal area with an unscented baby wipe, wet cloth, or wet toilet paper. Do this each morning and night and after every bowel movement. Don't use soap. This can irritate the area.  · Use  unscented, soft toilet paper.  · Avoid skin irritants in the anal area. These include soap, bubble baths, genital deodorants, and scented wipes.  · Wear loose-fitting underwear made of cotton. Avoid pantyhose and tight pants. Change underwear every day.  · Shower after exercise to rinse sweat from the anal area. Dry gently.  · Avoid foods and drinks that cause irritating bowel movements. These include coffee, acidic foods such as citrus and tomatoes, chocolate, nuts, and spicy foods.  · Eat more fiber in your diet.  · Don't use laxatives unless your provider tells you to.  Follow-up care  Follow up with your health care provider, or as advised.  When to seek medical advice  Call your healthcare provider right away if any of these occur:  · Fever of 100.4°F (38°C) or higher  · Stool leaking from the anus  · Increase in pain, rash, swelling, or itching after using prescribed medicine  · Blood in stool  · Small worms in the stool or around the anus.  · Bleeding from the anus that doesn’t stop  · Foul-smelling discharge from the anus that is not stool  Date Last Reviewed: 12/30/2015  © 8145-1277 [employer]. [address] [place] [address], [place] [address]. All rights reserved. This information is not intended as a substitute for professional medical care. Always follow your healthcare professional's instructions.            Pureed Diet

## 2023-10-30 NOTE — DISCHARGE NOTE PROVIDER - PROVIDER TOKENS
FREE:[LAST:[your primary care doctor],PHONE:[(   )    -],FAX:[(   )    -],FOLLOWUP:[1 week]],PROVIDER:[TOKEN:[[id number]],FOLLOWUP:[1 week]]

## 2023-10-30 NOTE — DISCHARGE NOTE PROVIDER - HOSPITAL COURSE
89 years old female with h/o HTN, HLD, GERD, decubitus ulcer was brought in to ED with concern for AMS. Daughter noted patient appear confused today AM with some blood around the tongue. Patient reported dysuria for last few days. No nausea or vomiting. Patient is bedbound since around 08/2023. Patient also appear to have some dysphagia and is currently only able to eat puree diet.   Febrile to 101.7, tachycardic upon arrival. WBC 14.29, plt 489, Hb 7.4, K 4.4, Cr 0.48, hsTnT 17.1, lactate 1.5. UA positive for UTI. RVP negative. CT head with no acute pathology. Mild to mod chronic microvascular changes. CXR with no focal consolidation    Vital Signs Last 24 Hrs  T(C): 36.6 (30 Oct 2023 10:47), Max: 37.1 (29 Oct 2023 17:17)  T(F): 97.9 (30 Oct 2023 10:47), Max: 98.7 (29 Oct 2023 17:17)  HR: 78 (30 Oct 2023 10:47) (36 - 78)  BP: 146/74 (30 Oct 2023 10:47) (126/63 - 146/74)  BP(mean): --  RR: 18 (30 Oct 2023 10:47) (18 - 18)  SpO2: 98% (30 Oct 2023 08:14) (94% - 98%)    Parameters below as of 30 Oct 2023 10:47  Patient On (Oxygen Delivery Method): nasal cannula  GENERAL: NAD,  no increased WOB  ENMT: Moist mucous membranes, tongue is swollen >>improved   NECK: Supple, No JVD, NO STRIDOR   NERVOUS SYSTEM: awake and alert, moving extremities   CHEST/LUNG: Clear to auscultation bilaterally; No rales, rhonchi, wheezing, or rubs  HEART: Regular rate and rhythm; No murmurs, rubs, or gallops  ABDOMEN: Soft, Nontender, Nondistended; Bowel sounds present  EXTREMITIES:  2+ Peripheral Pulses b/l, No clubbing, cyanosis, calf tenderness, calf tenderness  or edema b/l    Acute metabolic encephalopathy POA, patient is back to baseline MS   Sepsis POA (tachycardia, 101.7F , leukocytosis), E.coli UTI s/p abx   tongue bite and swelling  , bleeding resolved , no stridor or airway compromise at this time , tongue now back to baseline   ruling out CVA vs seizure   Chronic Microcytic anemia , per daughter is chronic and known to her PMD, daughter denies seeing brbpr or melena ; anemia labs with SHELLI and low folic acid , known to daughter states is chronic   functional 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 generalized slowing   transfuse 1u PRBC   venofer x 1   TSH OK   solumedrol x 3 for tongue swelling >>back to normal , swallowing without issues   completed ceftriaxone course    Neuro consulted appreciated, unable to tolerate MRI, will not , can follow-up with neuro outpatient, Neuro did not recommend to start AEDs at this time   PT wound appreciated recs   10/29 given water and apple sauce at bedside >>swallowed normally, tolerating  puree diet without issues       palliative following, DNR with trial of intubation   d/c home with resumption of services and home visiting RN for wound care management   education to family and patient on avoidance of driving, operating heavy machinery, showering alone or leaving the house alone , etc.     Discharge time : 40 min   RETURN PARAMETERS DISCUSSED WITH PATIENT and daughter , They EXPRESSED UNDERSTANDING AND IS AGREEABLE.    Care plan and all findings were discussed in detail with patient and daughter.  All questions and concerns addressed

## 2023-10-30 NOTE — PROGRESS NOTE ADULT - SUBJECTIVE AND OBJECTIVE BOX
Neurology Progress Note    S: Patient seen and examined. No new events overnight. patient denied CP, SOB, HA or pain.     Medication:  acetaminophen  Suppository .. 650 milliGRAM(s) Rectal every 6 hours PRN  enoxaparin Injectable 40 milliGRAM(s) SubCutaneous every 24 hours  folic acid 1 milliGRAM(s) Oral daily  melatonin 3 milliGRAM(s) Oral at bedtime PRN  metoprolol tartrate Injectable 5 milliGRAM(s) IV Push every 6 hours PRN  pantoprazole  Injectable 40 milliGRAM(s) IV Push daily      Vitals:  Vital Signs Last 24 Hrs  T(C): 36.4 (30 Oct 2023 04:55), Max: 37.1 (29 Oct 2023 17:17)  T(F): 97.6 (30 Oct 2023 04:55), Max: 98.7 (29 Oct 2023 17:17)  HR: 67 (30 Oct 2023 04:55) (67 - 77)  BP: 126/63 (30 Oct 2023 04:55) (126/63 - 133/74)  BP(mean): --  RR: 18 (30 Oct 2023 04:55) (18 - 18)  SpO2: 98% (30 Oct 2023 04:55) (94% - 100%)    Parameters below as of 30 Oct 2023 04:55  Patient On (Oxygen Delivery Method): room air        General Exam:   General Appearance: Appropriately dressed and in no acute distress       Head: Normocephalic, atraumatic and no dysmorphic features  Ear, Nose, and Throat: Moist mucous membranes  CVS: S1S2+  Resp: No SOB, no wheeze or rhonchi  Abd: soft NTND  Extremities: No edema, no cyanosis  Skin: No bruises, no rashes     Neurological Exam:  Mental Status: Awake, alert and oriented x 1-2 - knows name. [place]. Month is November, Follows simple commands.  Cranial Nerves: PERRL, EOMI, VFFC, sensation V1-V3 intact,  no obvious facial asymmetry, equal elevation of palate, scm/trap 5/5, tongue is midline on protrusion  Motor: at least 4/5 throughout with prompting  Sensation: Intact to light touch and pinprick throughout.     I personally reviewed the below data/images/labs:      CBC Full  -  ( 29 Oct 2023 05:20 )  WBC Count : 4.90 K/uL  RBC Count : 3.64 M/uL  Hemoglobin : 8.5 g/dL  Hematocrit : 28.9 %  Platelet Count - Automated : 438 K/uL  Mean Cell Volume : 79.4 fl  Mean Cell Hemoglobin : 23.4 pg  Mean Cell Hemoglobin Concentration : 29.4 g/dL  Auto Neutrophil # : x  Auto Lymphocyte # : x  Auto Monocyte # : x  Auto Eosinophil # : x  Auto Basophil # : x  Auto Neutrophil % : x  Auto Lymphocyte % : x  Auto Monocyte % : x  Auto Eosinophil % : x  Auto Basophil % : x    10-29    139  |  107  |  11  ----------------------------<  169<H>  4.0   |  24  |  0.36<L>    Ca    8.9      29 Oct 2023 05:20  Phos  2.7     10-29  Mg     2.3     10-29          Urinalysis Basic - ( 29 Oct 2023 05:20 )    Color: x / Appearance: x / SG: x / pH: x  Gluc: 169 mg/dL / Ketone: x  / Bili: x / Urobili: x   Blood: x / Protein: x / Nitrite: x   Leuk Esterase: x / RBC: x / WBC x   Sq Epi: x / Non Sq Epi: x / Bacteria: x      EEG SUMMARY/CLASSIFICATION    Abnormal EEG    - Mild to Moderate generalized slowing.    _____________________________________________________________  EEG IMPRESSION/CLINICAL CORRELATE    Abnormal EEG study.  Mild to Moderate nonspecific diffuse or multifocal cerebral dysfunction.   No epileptiform pattern or seizure seen.    Ray Cross MD  EEG/Epilepsy Attending    < from: CT Head No Cont (10.26.23 @ 12:21) >  IMPRESSION:  Mild to moderate chronic microvascular changes without   evidence of an acute transcortical infarction or hemorrhage.    < end of copied text >       Neurology Progress Note    S: Patient seen and examined. No new events overnight.     Medication:  acetaminophen  Suppository .. 650 milliGRAM(s) Rectal every 6 hours PRN  enoxaparin Injectable 40 milliGRAM(s) SubCutaneous every 24 hours  folic acid 1 milliGRAM(s) Oral daily  melatonin 3 milliGRAM(s) Oral at bedtime PRN  metoprolol tartrate Injectable 5 milliGRAM(s) IV Push every 6 hours PRN  pantoprazole  Injectable 40 milliGRAM(s) IV Push daily      Vitals:  Vital Signs Last 24 Hrs  T(C): 36.4 (30 Oct 2023 04:55), Max: 37.1 (29 Oct 2023 17:17)  T(F): 97.6 (30 Oct 2023 04:55), Max: 98.7 (29 Oct 2023 17:17)  HR: 67 (30 Oct 2023 04:55) (67 - 77)  BP: 126/63 (30 Oct 2023 04:55) (126/63 - 133/74)  BP(mean): --  RR: 18 (30 Oct 2023 04:55) (18 - 18)  SpO2: 98% (30 Oct 2023 04:55) (94% - 100%)    Parameters below as of 30 Oct 2023 04:55  Patient On (Oxygen Delivery Method): room air        General Exam:   General Appearance: Appropriately dressed and in no acute distress       Head: Normocephalic, atraumatic and no dysmorphic features  Ear, Nose, and Throat: Moist mucous membranes  CVS: S1S2+  Resp: No SOB, no wheeze or rhonchi  Abd: soft NTND  Extremities: No edema, no cyanosis  Skin: No bruises, no rashes     Neurological Exam:  Mental Status: Awake, alert and oriented x 1-2 - knows name. Says she is [place]. Month is November, Follows simple commands.  Cranial Nerves: PERRL, EOMI, VFFC, sensation V1-V3 intact,  no obvious facial asymmetry, equal elevation of palate, scm/trap 5/5, tongue is midline on protrusion  Motor: at least 4/5 throughout with prompting  Sensation: Intact to light touch and pinprick throughout.     I personally reviewed the below data/images/labs:      CBC Full  -  ( 29 Oct 2023 05:20 )  WBC Count : 4.90 K/uL  RBC Count : 3.64 M/uL  Hemoglobin : 8.5 g/dL  Hematocrit : 28.9 %  Platelet Count - Automated : 438 K/uL  Mean Cell Volume : 79.4 fl  Mean Cell Hemoglobin : 23.4 pg  Mean Cell Hemoglobin Concentration : 29.4 g/dL  Auto Neutrophil # : x  Auto Lymphocyte # : x  Auto Monocyte # : x  Auto Eosinophil # : x  Auto Basophil # : x  Auto Neutrophil % : x  Auto Lymphocyte % : x  Auto Monocyte % : x  Auto Eosinophil % : x  Auto Basophil % : x    10-29    139  |  107  |  11  ----------------------------<  169<H>  4.0   |  24  |  0.36<L>    Ca    8.9      29 Oct 2023 05:20  Phos  2.7     10-29  Mg     2.3     10-29          Urinalysis Basic - ( 29 Oct 2023 05:20 )    Color: x / Appearance: x / SG: x / pH: x  Gluc: 169 mg/dL / Ketone: x  / Bili: x / Urobili: x   Blood: x / Protein: x / Nitrite: x   Leuk Esterase: x / RBC: x / WBC x   Sq Epi: x / Non Sq Epi: x / Bacteria: x      EEG SUMMARY/CLASSIFICATION    Abnormal EEG    - Mild to Moderate generalized slowing.    _____________________________________________________________  EEG IMPRESSION/CLINICAL CORRELATE    Abnormal EEG study.  Mild to Moderate nonspecific diffuse or multifocal cerebral dysfunction.   No epileptiform pattern or seizure seen.    Ray Cross MD  EEG/Epilepsy Attending    < from: CT Head No Cont (10.26.23 @ 12:21) >  IMPRESSION:  Mild to moderate chronic microvascular changes without   evidence of an acute transcortical infarction or hemorrhage.    < end of copied text >

## 2023-10-30 NOTE — DIETITIAN NUTRITION RISK NOTIFICATION - TREATMENT: THE FOLLOWING DIET HAS BEEN RECOMMENDED
Diet, Pureed:   Supplement Feeding Modality:  Oral  Health Shake Cans or Servings Per Day:  1       Frequency:  Two Times a day (10-30-23 @ 11:42) [Pending Verification By Attending]  Diet, Pureed (10-29-23 @ 14:31) [Active]

## 2023-10-31 ENCOUNTER — TRANSCRIPTION ENCOUNTER (OUTPATIENT)
Age: 88
End: 2023-10-31

## 2023-10-31 VITALS
HEART RATE: 59 BPM | TEMPERATURE: 99 F | DIASTOLIC BLOOD PRESSURE: 67 MMHG | SYSTOLIC BLOOD PRESSURE: 133 MMHG | OXYGEN SATURATION: 93 % | RESPIRATION RATE: 18 BRPM

## 2023-10-31 LAB
CULTURE RESULTS: SIGNIFICANT CHANGE UP
SPECIMEN SOURCE: SIGNIFICANT CHANGE UP

## 2023-10-31 PROCEDURE — 99239 HOSP IP/OBS DSCHRG MGMT >30: CPT

## 2023-10-31 RX ORDER — FOLIC ACID 0.8 MG
1 TABLET ORAL
Qty: 0 | Refills: 0 | DISCHARGE
Start: 2023-10-31

## 2023-10-31 RX ORDER — LINACLOTIDE 145 UG/1
1 CAPSULE, GELATIN COATED ORAL
Qty: 0 | Refills: 0 | DISCHARGE

## 2023-10-31 RX ADMIN — PANTOPRAZOLE SODIUM 40 MILLIGRAM(S): 20 TABLET, DELAYED RELEASE ORAL at 11:00

## 2023-10-31 RX ADMIN — Medication 1 MILLIGRAM(S): at 11:00

## 2023-10-31 NOTE — CHART NOTE - NSCHARTNOTEFT_GEN_A_CORE
The beneficiary has a sacral decubitus ulcer which requires positioning of the body in ways not feasible with an ordinary bed. The beneficiary has a sacral decubitus ulcer which requires positioning of the body in ways not feasible with an ordinary bed. Gel overlay: patient with incontinence.

## 2023-10-31 NOTE — PROGRESS NOTE ADULT - ASSESSMENT
89F w/ HTN, HLD, GERD, decubitus ulcer was brought in to ED with concern for AMS, possible tongue bite.   In ED - Febrile to 101.7, tachycardic upon arrival. WBC 14.29, plt 489, Hb 7.4, K 4.4, Cr 0.48, hsTnT 17.1, lactate 1.5. UA positive for UTI. RVP negative.   CTH with extensive microvascular ischemic changes  EEG with generalized slowing   o/e is mildly confusd but moves all extremities equally without lateralizing deficit    Impression: Acute encephalopathy superimposed on likely vascular dementia, possibly due to UTI. Unclear if seizure  Chronic microcytic anemia  functional quadraplegia     Plan:  - as not yet back to baseline per daughter w/ tx of underlying infection can consider inpatient MRI brain if able to tolerate but doubt will change mgmt  - TSH, RPR  - EEG as above  - would not tx w/ AEDs at this time   - Tx of UTI per primary team   - PT/OT  dc planning per primary team    Lakeisha Arguello DO  Vascular Neurology  Office [phone]

## 2023-10-31 NOTE — PROGRESS NOTE ADULT - REASON FOR ADMISSION
sepsis due to UTI,  tongue swelling

## 2023-10-31 NOTE — CHART NOTE - NSCHARTNOTEFT_GEN_A_CORE
Patient seen and examined at bedside prior to discharge with daughter at bedside.   Resting comfortably and in no acute distress    Plan as per discharge note

## 2023-10-31 NOTE — DISCHARGE NOTE NURSING/CASE MANAGEMENT/SOCIAL WORK - NSDCFUADDAPPT_GEN_ALL_CORE_FT
It is important to see your primary physician as well as other necessary consultants within the next week to perform a comprehensive medical review.  Call their offices for an appointment as soon as you leave the hospital.  If you do not have a primary physician or cant reach him/her, contact the [facility] Physician Referral Service at [phone].  Your medical issues appear to be stable at this time, but if your symptoms recur or worsen, contact your physicians and/or return to the hospital if necessary.  If you encounter any issues or questions with your medication, call your physicians before stopping the medication.

## 2023-10-31 NOTE — DISCHARGE NOTE NURSING/CASE MANAGEMENT/SOCIAL WORK - PATIENT PORTAL LINK FT
You can access the FollowMyHealth Patient Portal offered by [facility] by registering at the following website: [email or web address]. By joining [employer] [facility]’s FollowMyHealth portal, you will also be able to view your health information using other applications (apps) compatible with our system.

## 2023-10-31 NOTE — PROGRESS NOTE ADULT - SUBJECTIVE AND OBJECTIVE BOX
Neurology Progress Note    S: Patient seen and examined. No new events overnight.       Medications: MEDICATIONS  (STANDING):  enoxaparin Injectable 40 milliGRAM(s) SubCutaneous every 24 hours  folic acid 1 milliGRAM(s) Oral daily  pantoprazole  Injectable 40 milliGRAM(s) IV Push daily    MEDICATIONS  (PRN):  acetaminophen  Suppository .. 650 milliGRAM(s) Rectal every 6 hours PRN Temp greater or equal to 38.5C (101.3F), Mild Pain (1 - 3)  melatonin 3 milliGRAM(s) Oral at bedtime PRN Insomnia  metoprolol tartrate Injectable 5 milliGRAM(s) IV Push every 6 hours PRN if SBP >160 or HR >100       Vitals:  Vital Signs Last 24 Hrs  T(C): 37 (31 Oct 2023 11:08), Max: 37.2 (30 Oct 2023 23:50)  T(F): 98.6 (31 Oct 2023 11:08), Max: 99 (30 Oct 2023 23:50)  HR: 59 (31 Oct 2023 11:08) (58 - 72)  BP: 133/67 (31 Oct 2023 11:08) (127/67 - 133/67)  BP(mean): --  RR: 18 (31 Oct 2023 11:08) (18 - 18)  SpO2: 93% (31 Oct 2023 11:08) (93% - 99%)    Parameters below as of 31 Oct 2023 11:08  Patient On (Oxygen Delivery Method): room air            General Exam:   General Appearance: Appropriately dressed and in no acute distress       Head: Normocephalic, atraumatic and no dysmorphic features  Ear, Nose, and Throat: Moist mucous membranes  CVS: S1S2+  Resp: No SOB, no wheeze or rhonchi  Abd: soft NTND  Extremities: No edema, no cyanosis  Skin: No bruises, no rashes     Neurological Exam:  Mental Status: Awake, alert and oriented x 1-2 - knows name. Says she [place] in [place] with [place] daughter. Month is November, Follows simple commands.  Cranial Nerves: PERRL, EOMI, VFFC, sensation V1-V3 intact,  no obvious facial asymmetry, equal elevation of palate, scm/trap 5/5, tongue is midline on protrusion  Motor: at least 4/5 throughout with prompting  Sensation: Intact to light touch and pinprick throughout.     I personally reviewed the below data/images/labs:    LABS:    No new labs    EEG SUMMARY/CLASSIFICATION    Abnormal EEG    - Mild to Moderate generalized slowing.    _____________________________________________________________  EEG IMPRESSION/CLINICAL CORRELATE    Abnormal EEG study.  Mild to Moderate nonspecific diffuse or multifocal cerebral dysfunction.   No epileptiform pattern or seizure seen.    Ray Cross MD  EEG/Epilepsy Attending    < from: CT Head No Cont (10.26.23 @ 12:21) >  IMPRESSION:  Mild to moderate chronic microvascular changes without   evidence of an acute transcortical infarction or hemorrhage.    < end of copied text >

## 2023-11-02 LAB
CULTURE RESULTS: SIGNIFICANT CHANGE UP
SPECIMEN SOURCE: SIGNIFICANT CHANGE UP

## 2023-11-03 DIAGNOSIS — M19.90 UNSPECIFIED OSTEOARTHRITIS, UNSPECIFIED SITE: ICD-10-CM

## 2023-11-03 DIAGNOSIS — N39.0 URINARY TRACT INFECTION, SITE NOT SPECIFIED: ICD-10-CM

## 2023-11-03 DIAGNOSIS — Z74.01 BED CONFINEMENT STATUS: ICD-10-CM

## 2023-11-03 DIAGNOSIS — I10 ESSENTIAL (PRIMARY) HYPERTENSION: ICD-10-CM

## 2023-11-03 DIAGNOSIS — Z66 DO NOT RESUSCITATE: ICD-10-CM

## 2023-11-03 DIAGNOSIS — S01.552A OPEN BITE OF ORAL CAVITY, INITIAL ENCOUNTER: ICD-10-CM
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
## 2023-11-03 DIAGNOSIS — E44.0 MODERATE PROTEIN-CALORIE MALNUTRITION: ICD-10-CM

## 2023-11-03 DIAGNOSIS — L89.153 PRESSURE ULCER OF SACRAL REGION, STAGE 3: ICD-10-CM

## 2023-11-03 DIAGNOSIS — E78.5 HYPERLIPIDEMIA, UNSPECIFIED: ICD-10-CM

## 2023-11-03 DIAGNOSIS — G93.41 METABOLIC ENCEPHALOPATHY: ICD-10-CM

## 2023-11-03 DIAGNOSIS — F01.50 VASCULAR DEMENTIA WITHOUT BEHAVIORAL DISTURBANCE: ICD-10-CM
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
## 2023-11-03 DIAGNOSIS — E87.6 HYPOKALEMIA: ICD-10-CM

## 2023-11-03 DIAGNOSIS — K21.9 GASTRO-ESOPHAGEAL REFLUX DISEASE WITHOUT ESOPHAGITIS: ICD-10-CM

## 2023-11-03 DIAGNOSIS — Z79.82 LONG TERM (CURRENT) USE OF ASPIRIN: ICD-10-CM

## 2023-11-03 DIAGNOSIS — D50.9 IRON DEFICIENCY ANEMIA, UNSPECIFIED: ICD-10-CM

## 2023-11-03 DIAGNOSIS — A41.51 SEPSIS DUE TO ESCHERICHIA COLI [E. COLI]: ICD-10-CM

## 2023-11-03 DIAGNOSIS — R53.2 FUNCTIONAL QUADRIPLEGIA: ICD-10-CM

## 2023-11-08 PROBLEM — I10 ESSENTIAL (PRIMARY) HYPERTENSION: Chronic | Status: ACTIVE | Noted: 2023-10-26

## 2023-11-08 PROBLEM — E78.00 PURE HYPERCHOLESTEROLEMIA, UNSPECIFIED: Chronic | Status: ACTIVE | Noted: 2023-10-26

## 2023-11-08 PROBLEM — Z00.00 ENCOUNTER FOR PREVENTIVE HEALTH EXAMINATION: Status: ACTIVE | Noted: 2023-11-08

## 2023-11-10 ENCOUNTER — APPOINTMENT (OUTPATIENT)
Dept: WOUND CARE | Facility: CLINIC | Age: 88
End: 2023-11-10
Payer: MEDICARE

## 2023-11-10 DIAGNOSIS — I10 ESSENTIAL (PRIMARY) HYPERTENSION: ICD-10-CM

## 2023-11-10 PROCEDURE — 99203 OFFICE O/P NEW LOW 30 MIN: CPT | Mod: 95

## 2023-11-10 RX ORDER — HYDROCHLOROTHIAZIDE 12.5 MG/1
CAPSULE ORAL
Refills: 0 | Status: ACTIVE | COMMUNITY

## 2023-11-10 RX ORDER — LINACLOTIDE 72 UG/1
CAPSULE, GELATIN COATED ORAL
Refills: 0 | Status: ACTIVE | COMMUNITY

## 2023-11-10 RX ORDER — ASPIRIN 81 MG
81 TABLET, DELAYED RELEASE (ENTERIC COATED) ORAL
Refills: 0 | Status: ACTIVE | COMMUNITY

## 2023-11-10 RX ORDER — ROSUVASTATIN CALCIUM 5 MG/1
5 TABLET, FILM COATED ORAL
Refills: 0 | Status: ACTIVE | COMMUNITY

## 2023-11-10 RX ORDER — AMLODIPINE BESYLATE 5 MG/1
5 TABLET ORAL
Refills: 0 | Status: ACTIVE | COMMUNITY

## 2023-11-10 RX ORDER — PANTOPRAZOLE 40 MG/1
40 TABLET, DELAYED RELEASE ORAL
Refills: 0 | Status: ACTIVE | COMMUNITY

## 2023-11-14 ENCOUNTER — OUTPATIENT (OUTPATIENT)
Dept: OUTPATIENT SERVICES | Facility: HOSPITAL | Age: 88
LOS: 1 days | Discharge: HOME | End: 2023-11-14

## 2023-11-14 DIAGNOSIS — L89.90 PRESSURE ULCER OF UNSPECIFIED SITE, UNSPECIFIED STAGE: ICD-10-CM

## 2023-11-15 DIAGNOSIS — M19.90 UNSPECIFIED OSTEOARTHRITIS, UNSPECIFIED SITE: ICD-10-CM

## 2023-11-15 DIAGNOSIS — Z72.4 INAPPROPRIATE DIET AND EATING HABITS: ICD-10-CM

## 2023-11-15 DIAGNOSIS — I10 ESSENTIAL (PRIMARY) HYPERTENSION: ICD-10-CM

## 2023-11-15 DIAGNOSIS — L89.150 PRESSURE ULCER OF SACRAL REGION, UNSTAGEABLE: ICD-10-CM

## 2023-11-15 DIAGNOSIS — Z86.73 PERSONAL HISTORY OF TRANSIENT ISCHEMIC ATTACK (TIA), AND CEREBRAL INFARCTION WITHOUT RESIDUAL DEFICITS: ICD-10-CM

## 2023-12-19 ENCOUNTER — APPOINTMENT (OUTPATIENT)
Dept: WOUND CARE | Facility: HOSPITAL | Age: 88
End: 2023-12-19
Payer: MEDICARE

## 2023-12-19 ENCOUNTER — OUTPATIENT (OUTPATIENT)
Dept: OUTPATIENT SERVICES | Facility: HOSPITAL | Age: 88
LOS: 1 days | End: 2023-12-19
Payer: MEDICARE

## 2023-12-19 DIAGNOSIS — L98.429 NON-PRESSURE CHRONIC ULCER OF BACK WITH UNSPECIFIED SEVERITY: ICD-10-CM

## 2023-12-19 DIAGNOSIS — L89.220 PRESSURE ULCER OF LEFT HIP, UNSTAGEABLE: ICD-10-CM

## 2023-12-19 PROCEDURE — 11042 DBRDMT SUBQ TIS 1ST 20SQCM/<: CPT

## 2023-12-21 PROBLEM — L89.220 PRESSURE INJURY OF LEFT HIP, UNSTAGEABLE: Status: ACTIVE | Noted: 2023-12-21

## 2023-12-21 PROBLEM — L98.429 ULCER OF SACRAL REGION, UNSTAGEABLE: Status: ACTIVE | Noted: 2023-11-15

## 2023-12-21 LAB — BACTERIA SPEC CULT: NORMAL

## 2023-12-21 RX ORDER — AMOXICILLIN AND CLAVULANATE POTASSIUM 875; 125 MG/1; MG/1
875-125 TABLET, COATED ORAL
Qty: 14 | Refills: 0 | Status: ACTIVE | COMMUNITY
Start: 2023-12-21 | End: 1900-01-01

## 2023-12-22 NOTE — REVIEW OF SYSTEMS
[Feeling Tired] : feeling tired [Incontinence] : incontinence [Skin Wound] : skin wound [Arthralgias] : arthralgias [Negative] : Endocrine [FreeTextEntry9] : bedbound

## 2023-12-22 NOTE — PHYSICAL EXAM
[Skin Ulcer] : ulcer [Alert] : alert [Oriented to Person] : oriented to person [Oriented to Place] : oriented to place [Oriented to Time] : oriented to time [Calm] : calm [Please See PDF for [employer]] : Please See PDF for [employer]. [de-identified] : Pleasant elderly woman, well groomed, resting on side in bed [de-identified] : arms/legs weak, unable to stand

## 2023-12-22 NOTE — PLAN
[FreeTextEntry1] : 12/19/23 Plan - orders for nurse/i also s/w nurse sacral - pack with 2inch kerlix moistened in vashe or dakins/4x4/foam, cavilon zac wound, daily for few days left hip - clean soap and water/mediney/aquacel/foam offload nutritional supplements given follow up TEB order roho cushion when sacral clean, will start vac

## 2023-12-22 NOTE — HISTORY OF PRESENT ILLNESS
[FreeTextEntry1] : 89 y/o presents with sacral wound approx. 2 months old, has visiting nurse, sacral unstageable, seen in pictures and real time. Mixed wound base, moist yellow slough, dark bruised black tissue, sacral and off to right ischial is superficial and clean Pt. is bedbound, does not have offloading mattress. Daughter reports appetite is good, takes in sources of protein Daughter reports she sits in wheelchair or recliner, no offloading

## 2023-12-22 NOTE — ASSESSMENT
[FreeTextEntry1] : 89 y/o presents with sacral wound approx. 2 months old, has visiting nurse, sacral unstageable, seen in pictures and real time. Mixed wound base, moist yellow slough, dark bruised black tissue, sacral and off to right ischial is superficial and clean Pt. is bedbound, does not have offloading mattress. Daughter reports appetite is good, takes in sources of protein Daughter reports she sits in wheelchair or recliner, no offloading 12/19/23 sacral wound - debrided huge piece of hanging non viable tissue , wound malodorous, culture obtained, s/p excisional debridement SACRAL STAGE 4 NON AMBULATORY, UNABLE TO SELF POSITION left hip - unstageable - adherent slough, cross hatched has gel overlay, needs roho cushion family purees food

## 2023-12-22 NOTE — REASON FOR VISIT
[Follow-Up: _____] : a [unfilled] follow-up visit [Home] : at home, [unfilled] , at the time of the visit. [Medical Office: ([facility])___] : at the medical office located in  [Family Member] : family member [FreeTextEntry2] : hcristy [FreeTextEntry3] : daughter [FreeTextEntry4] : Silvia NP

## 2023-12-26 DIAGNOSIS — L89.220 PRESSURE ULCER OF LEFT HIP, UNSTAGEABLE: ICD-10-CM

## 2023-12-26 DIAGNOSIS — L98.429 NON-PRESSURE CHRONIC ULCER OF BACK WITH UNSPECIFIED SEVERITY: ICD-10-CM

## 2023-12-29 NOTE — DIETITIAN INITIAL EVALUATION ADULT - PROBLEM/PLAN-6
Both knees injected  L Inj/Asp: bilateral knee on 12/29/2023 11:34 AM  Indications: pain  Details: 21 G needle, lateral approach  Medications (Right): 30 mg hyaluronan 30 mg/2 mL          
DISPLAY PLAN FREE TEXT

## 2024-01-23 ENCOUNTER — APPOINTMENT (OUTPATIENT)
Dept: WOUND CARE | Facility: HOSPITAL | Age: 89
End: 2024-01-23

## 2024-07-24 NOTE — DIETITIAN INITIAL EVALUATION ADULT - PROBLEM/PLAN-1
SURGERY DAILY PROGRESS NOTE:     Overnight Events:  No acute events overnight. POC went well. NGT functioning.    SUBJECTIVE: Patient seen and evaluated on AM rounds. Pt is resting comfortably in bed with no complaints. Denies fever, chills, N/V, chest pain, or shortness of breath. No BM or flatus yet. NPO. Pain 5/10 on PCA pump.     OBJECTIVE:  Vital Signs Last 24 Hrs  T(C): 36.9 (24 Jul 2024 05:22), Max: 37.2 (23 Jul 2024 20:54)  T(F): 98.4 (24 Jul 2024 05:22), Max: 98.9 (23 Jul 2024 20:54)  HR: 100 (23 Jul 2024 23:42) (90 - 109)  BP: 122/84 (23 Jul 2024 23:42) (114/75 - 147/76)  BP(mean): 102 (23 Jul 2024 17:30) (86 - 102)  RR: 18 (24 Jul 2024 05:22) (16 - 18)  SpO2: 100% (24 Jul 2024 05:22) (96% - 100%)    Parameters below as of 24 Jul 2024 05:22  Patient On (Oxygen Delivery Method): room air      I&O's Detail    23 Jul 2024 07:01  -  24 Jul 2024 07:00  --------------------------------------------------------  IN:    Lactated Ringers: 250 mL  Total IN: 250 mL    OUT:    Indwelling Catheter - Urethral (mL): 765 mL    Nasogastric/Oral tube (mL): 50 mL  Total OUT: 815 mL    Total NET: -565 mL      24 Jul 2024 07:01  -  24 Jul 2024 07:23  --------------------------------------------------------  IN:  Total IN: 0 mL    OUT:    Indwelling Catheter - Urethral (mL): 400 mL    Nasogastric/Oral tube (mL): 50 mL  Total OUT: 450 mL    Total NET: -450 mL        Daily Height in cm: 170.18 (23 Jul 2024 10:16)    Daily     LABS:                        10.5   14.42 )-----------( 695      ( 24 Jul 2024 07:10 )             32.6     07-23    139  |  100  |  18  ----------------------------<  96  5.0   |  25  |  0.83    Ca    9.5      23 Jul 2024 04:32    TPro  8.0  /  Alb  3.5  /  TBili  0.5  /  DBili  x   /  AST  25  /  ALT  37  /  AlkPhos  186<H>  07-23    PT/INR - ( 22 Jul 2024 20:16 )   PT: 14.3 sec;   INR: 1.37 ratio         PTT - ( 22 Jul 2024 20:16 )  PTT:39.6 sec  Urinalysis Basic - ( 23 Jul 2024 04:32 )    Color: x / Appearance: x / SG: x / pH: x  Gluc: 96 mg/dL / Ketone: x  / Bili: x / Urobili: x   Blood: x / Protein: x / Nitrite: x   Leuk Esterase: x / RBC: x / WBC x   Sq Epi: x / Non Sq Epi: x / Bacteria: x                PHYSICAL EXAM:  Constitutional: Well developed, well nourished, NAD  Pulmonary: Symmetric chest rise bilaterally, no increased WOB  Gastrointestinal: Abdomen soft, nontender, nondistended. No rebound or guarding. Midline incisions C/D/I.   Groin: Soft, nontender, no ecchymosis/hematoma, no erythema, no edema. [place] in place.   Extremities: FROM, warm to touch, no clubbing/cyanosis/erythema/edema       DISPLAY PLAN FREE TEXT
